# Patient Record
Sex: MALE | Race: ASIAN | NOT HISPANIC OR LATINO | URBAN - METROPOLITAN AREA
[De-identification: names, ages, dates, MRNs, and addresses within clinical notes are randomized per-mention and may not be internally consistent; named-entity substitution may affect disease eponyms.]

---

## 2018-05-03 PROBLEM — Z00.00 ENCOUNTER FOR PREVENTIVE HEALTH EXAMINATION: Status: ACTIVE | Noted: 2018-05-03

## 2018-05-04 ENCOUNTER — OUTPATIENT (OUTPATIENT)
Dept: OUTPATIENT SERVICES | Facility: HOSPITAL | Age: 73
LOS: 1 days | End: 2018-05-04
Payer: MEDICARE

## 2018-05-04 ENCOUNTER — APPOINTMENT (OUTPATIENT)
Dept: THORACIC SURGERY | Facility: CLINIC | Age: 73
End: 2018-05-04
Payer: MEDICARE

## 2018-05-04 VITALS
BODY MASS INDEX: 24.48 KG/M2 | RESPIRATION RATE: 18 BRPM | HEIGHT: 62 IN | WEIGHT: 133 LBS | SYSTOLIC BLOOD PRESSURE: 143 MMHG | TEMPERATURE: 97.8 F | DIASTOLIC BLOOD PRESSURE: 67 MMHG | HEART RATE: 74 BPM | OXYGEN SATURATION: 96 %

## 2018-05-04 VITALS
WEIGHT: 144 LBS | TEMPERATURE: 97 F | RESPIRATION RATE: 18 BRPM | HEIGHT: 69 IN | DIASTOLIC BLOOD PRESSURE: 72 MMHG | HEART RATE: 75 BPM | SYSTOLIC BLOOD PRESSURE: 128 MMHG | BODY MASS INDEX: 21.33 KG/M2 | OXYGEN SATURATION: 95 %

## 2018-05-04 DIAGNOSIS — J43.9 EMPHYSEMA, UNSPECIFIED: ICD-10-CM

## 2018-05-04 DIAGNOSIS — E78.5 HYPERLIPIDEMIA, UNSPECIFIED: ICD-10-CM

## 2018-05-04 DIAGNOSIS — Z87.891 PERSONAL HISTORY OF NICOTINE DEPENDENCE: ICD-10-CM

## 2018-05-04 DIAGNOSIS — R91.1 SOLITARY PULMONARY NODULE: ICD-10-CM

## 2018-05-04 DIAGNOSIS — Z87.19 PERSONAL HISTORY OF OTHER DISEASES OF THE DIGESTIVE SYSTEM: ICD-10-CM

## 2018-05-04 LAB
ALBUMIN SERPL ELPH-MCNC: 4.3 G/DL — SIGNIFICANT CHANGE UP (ref 3.3–5)
ALP SERPL-CCNC: 70 U/L — SIGNIFICANT CHANGE UP (ref 40–120)
ALT FLD-CCNC: 26 U/L — SIGNIFICANT CHANGE UP (ref 10–45)
ANION GAP SERPL CALC-SCNC: 13 MMOL/L — SIGNIFICANT CHANGE UP (ref 5–17)
APPEARANCE UR: CLEAR — SIGNIFICANT CHANGE UP
APTT BLD: 35.8 SEC — SIGNIFICANT CHANGE UP (ref 27.5–37.4)
AST SERPL-CCNC: 21 U/L — SIGNIFICANT CHANGE UP (ref 10–40)
BACTERIA # UR AUTO: PRESENT /HPF
BASOPHILS NFR BLD AUTO: 0.3 % — SIGNIFICANT CHANGE UP (ref 0–2)
BILIRUB SERPL-MCNC: 0.4 MG/DL — SIGNIFICANT CHANGE UP (ref 0.2–1.2)
BILIRUB UR-MCNC: NEGATIVE — SIGNIFICANT CHANGE UP
BUN SERPL-MCNC: 15 MG/DL — SIGNIFICANT CHANGE UP (ref 7–23)
CALCIUM SERPL-MCNC: 9.7 MG/DL — SIGNIFICANT CHANGE UP (ref 8.4–10.5)
CHLORIDE SERPL-SCNC: 102 MMOL/L — SIGNIFICANT CHANGE UP (ref 96–108)
CHOLEST SERPL-MCNC: 188 MG/DL — SIGNIFICANT CHANGE UP (ref 10–199)
CO2 SERPL-SCNC: 25 MMOL/L — SIGNIFICANT CHANGE UP (ref 22–31)
COLOR SPEC: YELLOW — SIGNIFICANT CHANGE UP
CREAT SERPL-MCNC: 0.77 MG/DL — SIGNIFICANT CHANGE UP (ref 0.5–1.3)
DIFF PNL FLD: (no result)
EOSINOPHIL NFR BLD AUTO: 1.7 % — SIGNIFICANT CHANGE UP (ref 0–6)
GLUCOSE SERPL-MCNC: 103 MG/DL — HIGH (ref 70–99)
GLUCOSE UR QL: NEGATIVE — SIGNIFICANT CHANGE UP
HBA1C BLD-MCNC: 5.9 % — HIGH (ref 4–5.6)
HBV SURFACE AG SER-ACNC: SIGNIFICANT CHANGE UP
HCT VFR BLD CALC: 46.9 % — SIGNIFICANT CHANGE UP (ref 39–50)
HDLC SERPL-MCNC: 68 MG/DL — SIGNIFICANT CHANGE UP (ref 40–125)
HGB BLD-MCNC: 15.8 G/DL — SIGNIFICANT CHANGE UP (ref 13–17)
INR BLD: 0.97 — SIGNIFICANT CHANGE UP (ref 0.88–1.16)
KETONES UR-MCNC: NEGATIVE — SIGNIFICANT CHANGE UP
LEUKOCYTE ESTERASE UR-ACNC: NEGATIVE — SIGNIFICANT CHANGE UP
LIPID PNL WITH DIRECT LDL SERPL: 83 MG/DL — SIGNIFICANT CHANGE UP
LYMPHOCYTES # BLD AUTO: 19.9 % — SIGNIFICANT CHANGE UP (ref 13–44)
MCHC RBC-ENTMCNC: 31.4 PG — SIGNIFICANT CHANGE UP (ref 27–34)
MCHC RBC-ENTMCNC: 33.7 G/DL — SIGNIFICANT CHANGE UP (ref 32–36)
MCV RBC AUTO: 93.2 FL — SIGNIFICANT CHANGE UP (ref 80–100)
MONOCYTES NFR BLD AUTO: 11 % — SIGNIFICANT CHANGE UP (ref 2–14)
NEUTROPHILS NFR BLD AUTO: 67.1 % — SIGNIFICANT CHANGE UP (ref 43–77)
NITRITE UR-MCNC: NEGATIVE — SIGNIFICANT CHANGE UP
PH UR: 6 — SIGNIFICANT CHANGE UP (ref 5–8)
PLATELET # BLD AUTO: 255 K/UL — SIGNIFICANT CHANGE UP (ref 150–400)
POTASSIUM SERPL-MCNC: 4.2 MMOL/L — SIGNIFICANT CHANGE UP (ref 3.5–5.3)
POTASSIUM SERPL-SCNC: 4.2 MMOL/L — SIGNIFICANT CHANGE UP (ref 3.5–5.3)
PROT SERPL-MCNC: 7.6 G/DL — SIGNIFICANT CHANGE UP (ref 6–8.3)
PROT UR-MCNC: NEGATIVE MG/DL — SIGNIFICANT CHANGE UP
PROTHROM AB SERPL-ACNC: 10.8 SEC — SIGNIFICANT CHANGE UP (ref 9.8–12.7)
RBC # BLD: 5.03 M/UL — SIGNIFICANT CHANGE UP (ref 4.2–5.8)
RBC # FLD: 13.3 % — SIGNIFICANT CHANGE UP (ref 10.3–16.9)
RBC CASTS # UR COMP ASSIST: < 5 /HPF — SIGNIFICANT CHANGE UP
SODIUM SERPL-SCNC: 140 MMOL/L — SIGNIFICANT CHANGE UP (ref 135–145)
SP GR SPEC: 1.02 — SIGNIFICANT CHANGE UP (ref 1–1.03)
TOTAL CHOLESTEROL/HDL RATIO MEASUREMENT: 2.8 RATIO — LOW (ref 3.4–9.6)
TRIGL SERPL-MCNC: 185 MG/DL — HIGH (ref 10–149)
TSH SERPL-MCNC: 0.99 UIU/ML — SIGNIFICANT CHANGE UP (ref 0.35–4.94)
UROBILINOGEN FLD QL: 2 E.U./DL
WBC # BLD: 7.8 K/UL — SIGNIFICANT CHANGE UP (ref 3.8–10.5)
WBC # FLD AUTO: 7.8 K/UL — SIGNIFICANT CHANGE UP (ref 3.8–10.5)
WBC UR QL: < 5 /HPF — SIGNIFICANT CHANGE UP

## 2018-05-04 PROCEDURE — 36415 COLL VENOUS BLD VENIPUNCTURE: CPT

## 2018-05-04 PROCEDURE — 94010 BREATHING CAPACITY TEST: CPT | Mod: 26

## 2018-05-04 PROCEDURE — 81001 URINALYSIS AUTO W/SCOPE: CPT

## 2018-05-04 PROCEDURE — 80061 LIPID PANEL: CPT

## 2018-05-04 PROCEDURE — 99205 OFFICE O/P NEW HI 60 MIN: CPT

## 2018-05-04 PROCEDURE — 84443 ASSAY THYROID STIM HORMONE: CPT

## 2018-05-04 PROCEDURE — 83036 HEMOGLOBIN GLYCOSYLATED A1C: CPT

## 2018-05-04 PROCEDURE — 94726 PLETHYSMOGRAPHY LUNG VOLUMES: CPT | Mod: 26

## 2018-05-04 PROCEDURE — 86901 BLOOD TYPING SEROLOGIC RH(D): CPT

## 2018-05-04 PROCEDURE — 85025 COMPLETE CBC W/AUTO DIFF WBC: CPT

## 2018-05-04 PROCEDURE — 86900 BLOOD TYPING SEROLOGIC ABO: CPT

## 2018-05-04 PROCEDURE — 94760 N-INVAS EAR/PLS OXIMETRY 1: CPT

## 2018-05-04 PROCEDURE — 80053 COMPREHEN METABOLIC PANEL: CPT

## 2018-05-04 PROCEDURE — 94729 DIFFUSING CAPACITY: CPT

## 2018-05-04 PROCEDURE — 94060 EVALUATION OF WHEEZING: CPT

## 2018-05-04 PROCEDURE — 94729 DIFFUSING CAPACITY: CPT | Mod: 26

## 2018-05-04 PROCEDURE — 94726 PLETHYSMOGRAPHY LUNG VOLUMES: CPT

## 2018-05-04 PROCEDURE — 85610 PROTHROMBIN TIME: CPT

## 2018-05-04 PROCEDURE — 85730 THROMBOPLASTIN TIME PARTIAL: CPT

## 2018-05-04 PROCEDURE — 86850 RBC ANTIBODY SCREEN: CPT

## 2018-05-04 PROCEDURE — 87340 HEPATITIS B SURFACE AG IA: CPT

## 2018-05-05 PROBLEM — Z87.891 FORMER SMOKER: Status: ACTIVE | Noted: 2018-05-05

## 2018-05-05 PROBLEM — E78.5 HLD (HYPERLIPIDEMIA): Status: ACTIVE | Noted: 2018-05-05

## 2018-05-05 PROBLEM — J43.9 EMPHYSEMA OF LUNG: Status: ACTIVE | Noted: 2018-05-05

## 2018-05-05 PROBLEM — Z87.19 HISTORY OF INGUINAL HERNIA: Status: RESOLVED | Noted: 2018-05-05 | Resolved: 2018-05-05

## 2018-05-05 RX ORDER — CETIRIZINE HYDROCHLORIDE 10 MG/1
10 TABLET, COATED ORAL DAILY
Refills: 0 | Status: ACTIVE | COMMUNITY

## 2018-05-05 RX ORDER — ASPIRIN 81 MG/1
81 TABLET ORAL DAILY
Refills: 0 | Status: ACTIVE | COMMUNITY

## 2018-05-05 RX ORDER — CHOLECALCIFEROL (VITAMIN D3) 1250 MCG
1.25 MG CAPSULE ORAL WEEKLY
Refills: 0 | Status: ACTIVE | COMMUNITY

## 2018-05-05 RX ORDER — ATORVASTATIN CALCIUM 10 MG/1
10 TABLET, FILM COATED ORAL
Refills: 0 | Status: ACTIVE | COMMUNITY

## 2018-05-05 RX ORDER — ICOSAPENT ETHYL 1000 MG/1
1 CAPSULE ORAL TWICE DAILY
Refills: 0 | Status: ACTIVE | COMMUNITY

## 2018-05-07 LAB
M TB TUBERC IFN-G BLD QL: 0.03 IU/ML — SIGNIFICANT CHANGE UP
M TB TUBERC IFN-G BLD QL: 0.8 IU/ML — SIGNIFICANT CHANGE UP
M TB TUBERC IFN-G BLD QL: POSITIVE
MITOGEN IGNF BCKGRD COR BLD-ACNC: >10 IU/ML — SIGNIFICANT CHANGE UP

## 2018-05-10 VITALS
DIASTOLIC BLOOD PRESSURE: 68 MMHG | TEMPERATURE: 98 F | OXYGEN SATURATION: 95 % | HEART RATE: 74 BPM | SYSTOLIC BLOOD PRESSURE: 121 MMHG | RESPIRATION RATE: 16 BRPM | HEIGHT: 69 IN | WEIGHT: 143.96 LBS

## 2018-05-11 ENCOUNTER — RESULT REVIEW (OUTPATIENT)
Age: 73
End: 2018-05-11

## 2018-05-11 ENCOUNTER — INPATIENT (INPATIENT)
Facility: HOSPITAL | Age: 73
LOS: 3 days | Discharge: HOME | DRG: 165 | End: 2018-05-15
Attending: THORACIC SURGERY (CARDIOTHORACIC VASCULAR SURGERY) | Admitting: THORACIC SURGERY (CARDIOTHORACIC VASCULAR SURGERY)
Payer: MEDICARE

## 2018-05-11 ENCOUNTER — APPOINTMENT (OUTPATIENT)
Dept: THORACIC SURGERY | Facility: HOSPITAL | Age: 73
End: 2018-05-11

## 2018-05-11 DIAGNOSIS — R91.1 SOLITARY PULMONARY NODULE: ICD-10-CM

## 2018-05-11 DIAGNOSIS — Z98.890 OTHER SPECIFIED POSTPROCEDURAL STATES: Chronic | ICD-10-CM

## 2018-05-11 LAB
ANION GAP SERPL CALC-SCNC: 14 MMOL/L — SIGNIFICANT CHANGE UP (ref 5–17)
APTT BLD: 28.4 SEC — SIGNIFICANT CHANGE UP (ref 27.5–37.4)
BASE EXCESS BLDA CALC-SCNC: -0.5 MMOL/L — SIGNIFICANT CHANGE UP (ref -2–3)
BUN SERPL-MCNC: 14 MG/DL — SIGNIFICANT CHANGE UP (ref 7–23)
CALCIUM SERPL-MCNC: 8.9 MG/DL — SIGNIFICANT CHANGE UP (ref 8.4–10.5)
CHLORIDE SERPL-SCNC: 99 MMOL/L — SIGNIFICANT CHANGE UP (ref 96–108)
CO2 SERPL-SCNC: 24 MMOL/L — SIGNIFICANT CHANGE UP (ref 22–31)
CREAT SERPL-MCNC: 0.77 MG/DL — SIGNIFICANT CHANGE UP (ref 0.5–1.3)
GAS PNL BLDA: SIGNIFICANT CHANGE UP
GLUCOSE SERPL-MCNC: 175 MG/DL — HIGH (ref 70–99)
HCO3 BLDA-SCNC: 24 MMOL/L — SIGNIFICANT CHANGE UP (ref 21–28)
HCT VFR BLD CALC: 44.1 % — SIGNIFICANT CHANGE UP (ref 39–50)
HGB BLD-MCNC: 14.9 G/DL — SIGNIFICANT CHANGE UP (ref 13–17)
INR BLD: 1.06 — SIGNIFICANT CHANGE UP (ref 0.88–1.16)
MCHC RBC-ENTMCNC: 31 PG — SIGNIFICANT CHANGE UP (ref 27–34)
MCHC RBC-ENTMCNC: 33.8 G/DL — SIGNIFICANT CHANGE UP (ref 32–36)
MCV RBC AUTO: 91.9 FL — SIGNIFICANT CHANGE UP (ref 80–100)
PCO2 BLDA: 41 MMHG — SIGNIFICANT CHANGE UP (ref 35–48)
PH BLDA: 7.39 — SIGNIFICANT CHANGE UP (ref 7.35–7.45)
PLATELET # BLD AUTO: 263 K/UL — SIGNIFICANT CHANGE UP (ref 150–400)
PO2 BLDA: 120 MMHG — HIGH (ref 83–108)
POTASSIUM SERPL-MCNC: 4.4 MMOL/L — SIGNIFICANT CHANGE UP (ref 3.5–5.3)
POTASSIUM SERPL-SCNC: 4.4 MMOL/L — SIGNIFICANT CHANGE UP (ref 3.5–5.3)
PROTHROM AB SERPL-ACNC: 11.8 SEC — SIGNIFICANT CHANGE UP (ref 9.8–12.7)
RBC # BLD: 4.8 M/UL — SIGNIFICANT CHANGE UP (ref 4.2–5.8)
RBC # FLD: 13 % — SIGNIFICANT CHANGE UP (ref 10.3–16.9)
SAO2 % BLDA: 98 % — SIGNIFICANT CHANGE UP (ref 95–100)
SODIUM SERPL-SCNC: 137 MMOL/L — SIGNIFICANT CHANGE UP (ref 135–145)
WBC # BLD: 18.5 K/UL — HIGH (ref 3.8–10.5)
WBC # FLD AUTO: 18.5 K/UL — HIGH (ref 3.8–10.5)

## 2018-05-11 PROCEDURE — S2900 ROBOTIC SURGICAL SYSTEM: CPT | Mod: NC

## 2018-05-11 PROCEDURE — 32666 THORACOSCOPY W/WEDGE RESECT: CPT | Mod: 59

## 2018-05-11 PROCEDURE — 71045 X-RAY EXAM CHEST 1 VIEW: CPT | Mod: 26,76

## 2018-05-11 PROCEDURE — 32674 THORACOSCOPY LYMPH NODE EXC: CPT

## 2018-05-11 PROCEDURE — 32668 THORACOSCOPY W/W RESECT DIAG: CPT

## 2018-05-11 PROCEDURE — 32652 THORACOSCOPY REM TOTL CORTEX: CPT

## 2018-05-11 PROCEDURE — 32669 THORACOSCOPY REMOVE SEGMENT: CPT

## 2018-05-11 RX ORDER — CEFAZOLIN SODIUM 1 G
2000 VIAL (EA) INJECTION EVERY 8 HOURS
Qty: 0 | Refills: 0 | Status: DISCONTINUED | OUTPATIENT
Start: 2018-05-11 | End: 2018-05-12

## 2018-05-11 RX ORDER — SENNA PLUS 8.6 MG/1
2 TABLET ORAL AT BEDTIME
Qty: 0 | Refills: 0 | Status: DISCONTINUED | OUTPATIENT
Start: 2018-05-11 | End: 2018-05-15

## 2018-05-11 RX ORDER — KETOROLAC TROMETHAMINE 30 MG/ML
15 SYRINGE (ML) INJECTION EVERY 8 HOURS
Qty: 0 | Refills: 0 | Status: DISCONTINUED | OUTPATIENT
Start: 2018-05-11 | End: 2018-05-15

## 2018-05-11 RX ORDER — BUPIVACAINE 13.3 MG/ML
20 INJECTION, SUSPENSION, LIPOSOMAL INFILTRATION ONCE
Qty: 0 | Refills: 0 | Status: DISCONTINUED | OUTPATIENT
Start: 2018-05-11 | End: 2018-05-13

## 2018-05-11 RX ORDER — SODIUM CHLORIDE 9 MG/ML
1000 INJECTION, SOLUTION INTRAVENOUS
Qty: 0 | Refills: 0 | Status: DISCONTINUED | OUTPATIENT
Start: 2018-05-11 | End: 2018-05-13

## 2018-05-11 RX ORDER — DOCUSATE SODIUM 100 MG
100 CAPSULE ORAL THREE TIMES A DAY
Qty: 0 | Refills: 0 | Status: DISCONTINUED | OUTPATIENT
Start: 2018-05-11 | End: 2018-05-15

## 2018-05-11 RX ORDER — BUPIVACAINE 13.3 MG/ML
20 INJECTION, SUSPENSION, LIPOSOMAL INFILTRATION ONCE
Qty: 0 | Refills: 0 | Status: DISCONTINUED | OUTPATIENT
Start: 2018-05-11 | End: 2018-05-11

## 2018-05-11 RX ORDER — CHOLECALCIFEROL (VITAMIN D3) 125 MCG
1 CAPSULE ORAL
Qty: 0 | Refills: 0 | COMMUNITY

## 2018-05-11 RX ORDER — CETIRIZINE HYDROCHLORIDE 10 MG/1
1 TABLET ORAL
Qty: 0 | Refills: 0 | COMMUNITY

## 2018-05-11 RX ORDER — ATORVASTATIN CALCIUM 80 MG/1
10 TABLET, FILM COATED ORAL AT BEDTIME
Qty: 0 | Refills: 0 | Status: DISCONTINUED | OUTPATIENT
Start: 2018-05-11 | End: 2018-05-15

## 2018-05-11 RX ORDER — ACETAMINOPHEN 500 MG
1000 TABLET ORAL ONCE
Qty: 0 | Refills: 0 | Status: COMPLETED | OUTPATIENT
Start: 2018-05-11 | End: 2018-05-11

## 2018-05-11 RX ORDER — HEPARIN SODIUM 5000 [USP'U]/ML
5000 INJECTION INTRAVENOUS; SUBCUTANEOUS EVERY 12 HOURS
Qty: 0 | Refills: 0 | Status: DISCONTINUED | OUTPATIENT
Start: 2018-05-11 | End: 2018-05-15

## 2018-05-11 RX ORDER — PANTOPRAZOLE SODIUM 20 MG/1
40 TABLET, DELAYED RELEASE ORAL
Qty: 0 | Refills: 0 | Status: DISCONTINUED | OUTPATIENT
Start: 2018-05-11 | End: 2018-05-15

## 2018-05-11 RX ADMIN — Medication 15 MILLIGRAM(S): at 22:04

## 2018-05-11 RX ADMIN — SENNA PLUS 2 TABLET(S): 8.6 TABLET ORAL at 22:04

## 2018-05-11 RX ADMIN — SODIUM CHLORIDE 30 MILLILITER(S): 9 INJECTION, SOLUTION INTRAVENOUS at 17:03

## 2018-05-11 RX ADMIN — Medication 1000 MILLIGRAM(S): at 20:40

## 2018-05-11 RX ADMIN — ATORVASTATIN CALCIUM 10 MILLIGRAM(S): 80 TABLET, FILM COATED ORAL at 22:04

## 2018-05-11 RX ADMIN — Medication 100 MILLIGRAM(S): at 22:04

## 2018-05-11 RX ADMIN — Medication 400 MILLIGRAM(S): at 19:46

## 2018-05-11 RX ADMIN — Medication 100 MILLIGRAM(S): at 19:46

## 2018-05-11 RX ADMIN — Medication 15 MILLIGRAM(S): at 22:20

## 2018-05-11 NOTE — H&P ADULT - NSHPREVIEWOFSYSTEMS_GEN_ALL_CORE
Review of Systems  CONSTITUTIONAL:  Denies Fevers / chills, sweats, fatigue, weight loss, weight gain                                      NEURO:  Denies parethesias, seizures, syncope, confusion                                                                                EYES:  Denies Blurry vision, discharge, pain, loss of vision                                                                                    ENMT:  Denies Difficulty hearing, vertigo, dysphagia, epistaxis, recent dental work                                       CV:  Denies Chest pain, palpitations, ROTH, orthopnea                                                                                          RESPIRATORY:  Denies Wheezing, SOB, cough / sputum, hemoptysis                                                                GI:  Denies Nausea, vomiting, diarrhea, constipation, melena, difficulty swallowing                                               : Denies Hematuria, dysuria, urgency, incontinence                                                                                         MUSCULOSKELETAL:  Denies arthritis, joint swelling, muscle weakness                                                             SKIN/BREAST:  Denies rash, itching, hair loss, masses                                                                                            PSYCH:  Denies depression, anxiety, suicidal ideation                                                                                               HEME/LYMPH:  Denies bruises easily, enlarged lymph nodes, tender lymph nodes                                        ENDOCRINE:  Denies cold intolerance, heat intolerance, polydipsia

## 2018-05-11 NOTE — PROGRESS NOTE ADULT - SUBJECTIVE AND OBJECTIVE BOX
Post operative / PACU note     Operation / Date: 5/11/18 L VATS LLL basilar segmentectomy, frozen + adenoCA    SUBJECTIVE ASSESSMENT:  Patient seen this afternoon at bedside, doing well and not offering any complaints at this time. Denies any chest pain, shortness of breath or palpitations.     Vital Signs Last 24 Hrs  T(C): 36.2 (11 May 2018 15:41), Max: 36.2 (11 May 2018 15:41)  T(F): 97.1 (11 May 2018 15:41), Max: 97.1 (11 May 2018 15:41)  HR: 68 (11 May 2018 16:34) (64 - 68)  BP: 138/76 (11 May 2018 16:34) (112/65 - 138/76)  BP(mean): 88 (11 May 2018 16:19) (88 - 88)  RR: 27 (11 May 2018 16:34) (16 - 27)  SpO2: 98% (11 May 2018 16:34) (98% - 100%)  I&O's Detail      CHEST TUBE: Yes on suction, no air leak   LEYLA DRAIN:  No  EPICARDIAL WIRES: No  TIE DOWNS: No  NATION: Yes     PHYSICAL EXAM:    General: Patient lying comfortably in bed, no acute distress     Neurological: Alert and oriented. No focal neurological deficits     Cardiovascular: S1S2, RRR, no murmurs appreciated on exam     Respiratory: Apices clear to ausculation, unable to auscultate posterior lung fields     Gastrointestinal: Abdomen soft, non tender, non distended     Extremities: Warm and well perfused. No edema or calf tenderness     Vascular: Peripheral pulses 2+ bilaterally     Incision Sites:  L VATS incisions C/D/I   Chest tube secured in place     LABS:                        14.9   18.5  )-----------( 263      ( 11 May 2018 16:27 )             44.1       COUMADIN: No        05-11    137  |  99  |  14  ----------------------------<  175<H>  4.4   |  24  |  0.77    Ca    8.9      11 May 2018 16:27            MEDICATIONS  (STANDING):  acetaminophen  IVPB. 1000 milliGRAM(s) IV Intermittent once  atorvastatin 10 milliGRAM(s) Oral at bedtime  BUpivacaine liposome 1.3% Injectable (no eMAR) 20 milliLiter(s) Local Injection once  ceFAZolin   IVPB 2000 milliGRAM(s) IV Intermittent every 8 hours  heparin  Injectable 5000 Unit(s) SubCutaneous every 12 hours  lactated ringers. 1000 milliLiter(s) (30 mL/Hr) IV Continuous <Continuous>    MEDICATIONS  (PRN):  ketorolac   Injectable 15 milliGRAM(s) IV Push every 8 hours PRN Moderate Pain        RADIOLOGY & ADDITIONAL TESTS:    A/P: 72 year old male, former smoker, PMHx HLD, gastric ulcer, bilateral inguinal hernia s/p repair 2009 referred to Dr. Weber as an outpatient for finding of new LLL lung nodule. Patient presented today for elective surgery and underwent L VATS LLL segmentectomy Procedure uncomplicated, seen in PACU doing well.     Neurovascular: Stable. Pain well controlled with current regimen.  - continue tylenol and toradol PRN     Cardiovascular: Hemodynamically stable. HR controlled.  - HLD, continue atorvastatin 10mg qhs     Respiratory: 02 Sat = 99% on supplemental O2   - Continue to wean O2 to maintain SaO2 > 93%   - Post op CXR stable, follow up CXR in AM   - Left pleural chest tube in place, to remain on suction overnight   - Encourage C+DB and Use of IS 10x / hr while awake.    GI: Stable.  - Advance diet as tolerated   - protonix 40mg for GI ppx   - continue bowel regimen     Renal / : Cr 0.77, no active issues   - Monitor renal function.  - Monitor I/O's.    Endocrine: hgba1c 5.9, TSH 0.994   - no active issues     Hematologic:  -CBC.  -Coagulation Panel.    ID:  -Tempature.  -CBC.  -Observe for SIRS/Sepsis Syndrome.    Prophylaxis:  -DVT prophylaxis with 5000 SubQ Heparin q8h.  -SCD's    Disposition: 9L post PACU

## 2018-05-11 NOTE — H&P ADULT - ASSESSMENT
This is a 73 y/o male, former smoker x 30 years (1PPD; Quit 6 yrs ago), with a PMHx of HLD, gastric ulcer, B/L inguinal hernia in 1995 in [place], Rt and Lt inguinal hernia repair 2009 who was referred to Dr. Weber by Dr. Baltazar Nguyen for evaluation of LLL lung nodule.  CT chest done 4/7/18 revealing a LLL nodule new since 2014.  PET scan and other pre-op testing has been complete, reports on chart.  Here now for elective surgery.

## 2018-05-11 NOTE — BRIEF OPERATIVE NOTE - PROCEDURE
<<-----Click on this checkbox to enter Procedure Segmentectomy, lung, using video-assisted thoracoscopic surgery  05/11/2018  basilar left lower lobe  Active  [id number]

## 2018-05-11 NOTE — H&P ADULT - PROBLEM SELECTOR PLAN 1
Admit under Dr. Weber via same day surgery. Consent signed, placed on chart.  Risks/benefits reviewed, patient understands and agrees. T&S ordered and blood products placed on hold for OR.  To 9LA post-op. Admit under Dr. Weber via same day surgery for left VATS, LLL wedge resection, possible lobectomy. Consent signed, placed on chart.  Risks/benefits reviewed, patient understands and agrees. T&S ordered and blood products placed on hold for OR.  To 9LA post-op.

## 2018-05-11 NOTE — H&P ADULT - NSHPPHYSICALEXAM_GEN_ALL_CORE
Physical Exam  CONSTITUTIONAL:                                                              WNL  NEURO:                                                                       WNL                      EYES:                                                                                WNL  ENMT:                                                                               WNL  CV:                                                                                   WNL  RESPIRATORY:                                                                 WNL  GI:                                                                                     WNL  : NATION + / -                                                                  WNL  MUSKULOSKELETAL:                                                       WNL  SKIN / BREAST:                                                                  WNL Physical Exam  CONSTITUTIONAL:                                                              NAD, pleasant, smiling.  Daughter present.   NEURO:                                                                      NO focal deficits.                    EYES:                                                                                WNL  ENMT:                                                                               WNL  CV:                                                                                  S1S2 regular.    RESPIRATORY:                                                                 Clear B/L  GI:                                                                                     WNL  : THAO + / -                                                                  NO thao   MUSKULOSKELETAL:                                                       WNL  SKIN / BREAST:                                                                  WNL  Ext: Warm and well perfused.

## 2018-05-11 NOTE — H&P ADULT - HISTORY OF PRESENT ILLNESS
Patient seen in same day holding area; Reports no changes to PMHx or medications since last seen by our team. Denies acute or current SOB, chest pain, palpitation, N/V/D, fever/chills, recent illness, or any other concerning symptoms. This is a 71 y/o male, former smoker x 30 years (1PPD; Quit 6 yrs ago), with a PMHx of HLD, gastric ulcer, B/L inguinal hernia in 1995 in [place], Rt and Lt inguinal hernia repair 2009 who was referred to Dr. Weber by Dr. Baltazar Nguyen for evaluation of LLL lung nodule.  CT chest done 4/7/18 revealing a LLL nodule new since 2014.  PET scan and other pre-op testing has been complete, reports on chart.  Here now for elective surgery.     Patient seen in same day holding area; Reports no changes to PMHx or medications since last seen by our team. Denies acute or current SOB, chest pain, palpitation, N/V/D, fever/chills, recent illness, or any other concerning symptoms. This is a 73 y/o male, former smoker x 30 years (1PPD; Quit 6 yrs ago), with a PMHx of HLD, gastric ulcer, B/L inguinal hernia in 1995 in [place], Rt and Lt inguinal hernia repair 2009 who was referred to Dr. Weber by Dr. Baltazar Nguyen for evaluation of LLL lung nodule.  CT chest done 4/7/18 revealing a LLL nodule new since 2014.  PET scan and other pre-op testing has been complete, reports on chart.  Here now for elective surgery.  **Last took ASA 1 week ago.     Patient seen in same day holding area; Reports no changes to PMHx or medications since last seen by our team. Denies acute or current SOB, chest pain, palpitation, N/V/D, fever/chills, recent illness, or any other concerning symptoms.

## 2018-05-12 LAB
ANION GAP SERPL CALC-SCNC: 15 MMOL/L — SIGNIFICANT CHANGE UP (ref 5–17)
APTT BLD: 28.7 SEC — SIGNIFICANT CHANGE UP (ref 27.5–37.4)
BUN SERPL-MCNC: 15 MG/DL — SIGNIFICANT CHANGE UP (ref 7–23)
CALCIUM SERPL-MCNC: 8.8 MG/DL — SIGNIFICANT CHANGE UP (ref 8.4–10.5)
CHLORIDE SERPL-SCNC: 99 MMOL/L — SIGNIFICANT CHANGE UP (ref 96–108)
CO2 SERPL-SCNC: 24 MMOL/L — SIGNIFICANT CHANGE UP (ref 22–31)
CREAT SERPL-MCNC: 0.8 MG/DL — SIGNIFICANT CHANGE UP (ref 0.5–1.3)
GLUCOSE SERPL-MCNC: 148 MG/DL — HIGH (ref 70–99)
HCT VFR BLD CALC: 44.8 % — SIGNIFICANT CHANGE UP (ref 39–50)
HGB BLD-MCNC: 15.2 G/DL — SIGNIFICANT CHANGE UP (ref 13–17)
INR BLD: 1.08 — SIGNIFICANT CHANGE UP (ref 0.88–1.16)
MAGNESIUM SERPL-MCNC: 2.1 MG/DL — SIGNIFICANT CHANGE UP (ref 1.6–2.6)
MCHC RBC-ENTMCNC: 31.3 PG — SIGNIFICANT CHANGE UP (ref 27–34)
MCHC RBC-ENTMCNC: 33.9 G/DL — SIGNIFICANT CHANGE UP (ref 32–36)
MCV RBC AUTO: 92.4 FL — SIGNIFICANT CHANGE UP (ref 80–100)
PHOSPHATE SERPL-MCNC: 4.1 MG/DL — SIGNIFICANT CHANGE UP (ref 2.5–4.5)
PLATELET # BLD AUTO: 257 K/UL — SIGNIFICANT CHANGE UP (ref 150–400)
POTASSIUM SERPL-MCNC: 4.2 MMOL/L — SIGNIFICANT CHANGE UP (ref 3.5–5.3)
POTASSIUM SERPL-SCNC: 4.2 MMOL/L — SIGNIFICANT CHANGE UP (ref 3.5–5.3)
PROTHROM AB SERPL-ACNC: 12 SEC — SIGNIFICANT CHANGE UP (ref 9.8–12.7)
RBC # BLD: 4.85 M/UL — SIGNIFICANT CHANGE UP (ref 4.2–5.8)
RBC # FLD: 13.1 % — SIGNIFICANT CHANGE UP (ref 10.3–16.9)
SODIUM SERPL-SCNC: 138 MMOL/L — SIGNIFICANT CHANGE UP (ref 135–145)
WBC # BLD: 13.1 K/UL — HIGH (ref 3.8–10.5)
WBC # FLD AUTO: 13.1 K/UL — HIGH (ref 3.8–10.5)

## 2018-05-12 PROCEDURE — 71045 X-RAY EXAM CHEST 1 VIEW: CPT | Mod: 26

## 2018-05-12 RX ORDER — POLYETHYLENE GLYCOL 3350 17 G/17G
17 POWDER, FOR SOLUTION ORAL DAILY
Qty: 0 | Refills: 0 | Status: DISCONTINUED | OUTPATIENT
Start: 2018-05-12 | End: 2018-05-15

## 2018-05-12 RX ORDER — ACETAMINOPHEN 500 MG
650 TABLET ORAL EVERY 6 HOURS
Qty: 0 | Refills: 0 | Status: DISCONTINUED | OUTPATIENT
Start: 2018-05-13 | End: 2018-05-15

## 2018-05-12 RX ORDER — CEFAZOLIN SODIUM 1 G
2000 VIAL (EA) INJECTION EVERY 8 HOURS
Qty: 0 | Refills: 0 | Status: COMPLETED | OUTPATIENT
Start: 2018-05-12 | End: 2018-05-12

## 2018-05-12 RX ORDER — ACETAMINOPHEN 500 MG
1000 TABLET ORAL ONCE
Qty: 0 | Refills: 0 | Status: DISCONTINUED | OUTPATIENT
Start: 2018-05-12 | End: 2018-05-13

## 2018-05-12 RX ADMIN — SENNA PLUS 2 TABLET(S): 8.6 TABLET ORAL at 21:51

## 2018-05-12 RX ADMIN — POLYETHYLENE GLYCOL 3350 17 GRAM(S): 17 POWDER, FOR SOLUTION ORAL at 16:41

## 2018-05-12 RX ADMIN — Medication 100 MILLIGRAM(S): at 05:21

## 2018-05-12 RX ADMIN — HEPARIN SODIUM 5000 UNIT(S): 5000 INJECTION INTRAVENOUS; SUBCUTANEOUS at 05:21

## 2018-05-12 RX ADMIN — Medication 100 MILLIGRAM(S): at 21:51

## 2018-05-12 RX ADMIN — HEPARIN SODIUM 5000 UNIT(S): 5000 INJECTION INTRAVENOUS; SUBCUTANEOUS at 18:40

## 2018-05-12 RX ADMIN — Medication 2000 MILLIGRAM(S): at 12:21

## 2018-05-12 RX ADMIN — Medication 100 MILLIGRAM(S): at 13:22

## 2018-05-12 RX ADMIN — PANTOPRAZOLE SODIUM 40 MILLIGRAM(S): 20 TABLET, DELAYED RELEASE ORAL at 07:07

## 2018-05-12 RX ADMIN — Medication 2000 MILLIGRAM(S): at 04:30

## 2018-05-12 RX ADMIN — Medication 15 MILLIGRAM(S): at 16:41

## 2018-05-12 RX ADMIN — Medication 15 MILLIGRAM(S): at 18:09

## 2018-05-12 RX ADMIN — ATORVASTATIN CALCIUM 10 MILLIGRAM(S): 80 TABLET, FILM COATED ORAL at 21:51

## 2018-05-12 NOTE — PROGRESS NOTE ADULT - SUBJECTIVE AND OBJECTIVE BOX
Patient discussed on morning rounds with Dr. Hilliard    Operation / Date: 5/11/18 RA L multiple wedge resection with MARIELA basilar segmentectomy, MLND    SUBJECTIVE ASSESSMENT:  72y Male seen and examined. Today patient has no acute complaints, is ambulating, using IS, pulling 1000cc, tolerating PO diet, no BM yet, is passing gas.  Denies fever, chest pain, palpitations, SOB, abdoimnal pain, n/v.        Vital Signs Last 24 Hrs  T(C): 36.6 (12 May 2018 09:40), Max: 37 (12 May 2018 05:00)  T(F): 97.8 (12 May 2018 09:40), Max: 98.6 (12 May 2018 05:00)  HR: 62 (12 May 2018 08:38) (62 - 96)  BP: 119/59 (12 May 2018 08:38) (112/65 - 146/74)  BP(mean): 83 (12 May 2018 08:38) (80 - 102)  RR: 16 (12 May 2018 08:38) (16 - 27)  SpO2: 97% (12 May 2018 08:38) (97% - 100%)  I&O's Detail    11 May 2018 07:01  -  12 May 2018 07:00  --------------------------------------------------------  IN:    Oral Fluid: 200 mL  Total IN: 200 mL    OUT:    Chest Tube: 320 mL    Indwelling Catheter - Urethral: 550 mL  Total OUT: 870 mL    Total NET: -670 mL      12 May 2018 07:01  -  12 May 2018 10:35  --------------------------------------------------------  IN:  Total IN: 0 mL    OUT:    Chest Tube: 30 mL  Total OUT: 30 mL    Total NET: -30 mL          CHEST TUBE:  Yes x 1, on suction with 1/5 AL  LEYLA DRAIN:  No.  EPICARDIAL WIRES:No.  TIE DOWNS: Yes  NATION: No.    PHYSICAL EXAM:    General: Patient lying comfortably in bed, no acute distress     Neurological: Alert and oriented. No focal neurological deficits     Cardiovascular: S1S2, RRR, no murmurs appreciated on exam     Respiratory: + scattered rhonchi, no wheeze/rales    Gastrointestinal: Abdomen soft, non tender, non distended     Extremities: Warm and well perfused. No edema or calf tenderness     Vascular: 2+ Peripheral pulses     Incision Sites: L VATS: CDI. L CT Site: CDI, no drainage.    LABS:                        15.2   13.1  )-----------( 257      ( 12 May 2018 07:04 )             44.8       COUMADIN:  No    PT/INR - ( 12 May 2018 07:04 )   PT: 12.0 sec;   INR: 1.08          PTT - ( 12 May 2018 07:04 )  PTT:28.7 sec    05-12    138  |  99  |  15  ----------------------------<  148<H>  4.2   |  24  |  0.80    Ca    8.8      12 May 2018 07:04  Phos  4.1     05-12  Mg     2.1     05-12            MEDICATIONS  (STANDING):  atorvastatin 10 milliGRAM(s) Oral at bedtime  BUpivacaine liposome 1.3% Injectable (no eMAR) 20 milliLiter(s) Local Injection once  ceFAZolin  Injectable. 2000 milliGRAM(s) IV Push every 8 hours  docusate sodium 100 milliGRAM(s) Oral three times a day  heparin  Injectable 5000 Unit(s) SubCutaneous every 12 hours  lactated ringers. 1000 milliLiter(s) (30 mL/Hr) IV Continuous <Continuous>  pantoprazole    Tablet 40 milliGRAM(s) Oral before breakfast  senna 2 Tablet(s) Oral at bedtime    MEDICATIONS  (PRN):  ketorolac   Injectable 15 milliGRAM(s) IV Push every 8 hours PRN Moderate Pain        RADIOLOGY & ADDITIONAL TESTS:  < from: Xray Chest 1 View- PORTABLE-Routine (05.12.18 @ 07:36) >  Portable examination chest demonstrates the heart to be within normal   limits in transverse diameter. No acute infiltrates. Left chest tube   noted. Subcutaneous emphysema overlying soft tissue left neck with   improvement in comparison to prior examination of the chest 5/11/2018.    Impression: No acute infiltrates     < end of copied text >

## 2018-05-12 NOTE — PROGRESS NOTE ADULT - ASSESSMENT
72 year old male, former smoker, PMHx HLD, gastric ulcer, bilateral inguinal hernia s/p repair 2009 referred to Dr. Weber as an outpatient for finding of new LLL lung nodule. Patient presented today for elective surgery and underwent L VATS LLL segmentectomy Procedure uncomplicated. POD1 CT with 1/5 AL, continue to suction.     Neurovascular: Stable. Pain well controlled with current regimen.  - continue tylenol and toradol PRN     Cardiovascular: Hemodynamically stable. HR controlled.  - HLD, continue atorvastatin 10mg qhs     Respiratory: 02 Sat = 97% RA   - Continue to wean O2 to maintain SaO2 > 93%   - CXR stable, CT in place, no obvious PTX, results above  - Left pleural chest tube in place on suction with 1/5 AL, continue to suction f/u AM CXR per Dr. Hilliard  - Encourage C+DB and Use of IS 10x / hr while awake.    GI: Stable.  -PO Diet  - protonix 40mg for GI ppx   - continue bowel regimen     Renal / : Cr 0.80, no active issues   - Monitor renal function.  - Monitor I/O's.  -thao remove f/u TOV     Endocrine: hgba1c 5.9, TSH 0.994   - no active issues     Hematologic: H&H 15/44  -f/u AM CBC.    ID: afebrile, WBC 13.1 from 18.5  -complete periop abx  -Observe for SIRS/Sepsis Syndrome.    Prophylaxis:  -DVT prophylaxis with 5000 SubQ Heparin q8h.  -SCD's    Disposition:   -Home when ready

## 2018-05-13 PROCEDURE — 71045 X-RAY EXAM CHEST 1 VIEW: CPT | Mod: 26,77

## 2018-05-13 PROCEDURE — 71045 X-RAY EXAM CHEST 1 VIEW: CPT | Mod: 26

## 2018-05-13 RX ORDER — MULTIVIT WITH MIN/MFOLATE/K2 340-15/3 G
200 POWDER (GRAM) ORAL ONCE
Qty: 0 | Refills: 0 | Status: COMPLETED | OUTPATIENT
Start: 2018-05-13 | End: 2018-05-13

## 2018-05-13 RX ADMIN — HEPARIN SODIUM 5000 UNIT(S): 5000 INJECTION INTRAVENOUS; SUBCUTANEOUS at 06:06

## 2018-05-13 RX ADMIN — PANTOPRAZOLE SODIUM 40 MILLIGRAM(S): 20 TABLET, DELAYED RELEASE ORAL at 06:05

## 2018-05-13 RX ADMIN — Medication 100 MILLIGRAM(S): at 06:06

## 2018-05-13 RX ADMIN — Medication 100 MILLIGRAM(S): at 11:44

## 2018-05-13 RX ADMIN — Medication 200 MILLILITER(S): at 11:44

## 2018-05-13 RX ADMIN — SENNA PLUS 2 TABLET(S): 8.6 TABLET ORAL at 21:17

## 2018-05-13 RX ADMIN — Medication 100 MILLIGRAM(S): at 21:17

## 2018-05-13 RX ADMIN — ATORVASTATIN CALCIUM 10 MILLIGRAM(S): 80 TABLET, FILM COATED ORAL at 21:17

## 2018-05-13 RX ADMIN — HEPARIN SODIUM 5000 UNIT(S): 5000 INJECTION INTRAVENOUS; SUBCUTANEOUS at 17:12

## 2018-05-13 NOTE — PROGRESS NOTE ADULT - SUBJECTIVE AND OBJECTIVE BOX
Patient discussed on morning rounds with Dr. Weber      Operation / Date: 5/11/18 L VATS RA LLL wedge resections and subsequent basilar segmentectomy     SUBJECTIVE ASSESSMENT:  Patient seen this morning at bedside, doing well and not offering any complaints at this time. Denies any chest pain, shortness of breath or palpitations. Patient has not had a bowel movement since surgery, is passing gas and tolerating PO without any nausea or vomiting.     Vital Signs Last 24 Hrs  T(C): 37 (13 May 2018 13:55), Max: 37 (12 May 2018 17:11)  T(F): 98.6 (13 May 2018 13:55), Max: 98.6 (12 May 2018 17:11)  HR: 68 (13 May 2018 11:44) (64 - 72)  BP: 128/74 (13 May 2018 11:44) (120/70 - 144/71)  BP(mean): 94 (13 May 2018 11:44) (90 - 102)  RR: 14 (13 May 2018 11:44) (12 - 17)  SpO2: 96% (13 May 2018 11:44) (95% - 98%)  I&O's Detail    12 May 2018 07:01  -  13 May 2018 07:00  --------------------------------------------------------  IN:    Oral Fluid: 250 mL  Total IN: 250 mL    OUT:    Chest Tube: 410 mL    Voided: 770 mL  Total OUT: 1180 mL    Total NET: -930 mL      13 May 2018 07:01  -  13 May 2018 15:31  --------------------------------------------------------  IN:  Total IN: 0 mL    OUT:    Chest Tube: 160 mL  Total OUT: 160 mL    Total NET: -160 mL          CHEST TUBE: Yes on suction, intermittent 1/5 air leak   LEYLA DRAIN:  No  EPICARDIAL WIRES: No  TIE DOWNS: No  NATION: No    PHYSICAL EXAM:    General: Patient sitting comfortably in bed, no acute distress     Neurological: Alert and oriented. No focal neurological deficits     Cardiovascular: S1S2, RRR, no murmurs appreciated on exam     Respiratory: Clear to ausculation bilaterally     Gastrointestinal: Abdomen soft, non tender, non distended     Extremities: Warm and well perfused. No edema or calf tenderness     Vascular: Peripheral pulses 2+ bilaterally     Incision Sites:  L VATS incisions C/D/I, no drainage or surrounding erythema   Chest tube secured in place, C/D/I        LABS:                        15.2   13.1  )-----------( 257      ( 12 May 2018 07:04 )             44.8       COUMADIN:  No    PT/INR - ( 12 May 2018 07:04 )   PT: 12.0 sec;   INR: 1.08          PTT - ( 12 May 2018 07:04 )  PTT:28.7 sec    05-12    138  |  99  |  15  ----------------------------<  148<H>  4.2   |  24  |  0.80    Ca    8.8      12 May 2018 07:04  Phos  4.1     05-12  Mg     2.1     05-12      MEDICATIONS  (STANDING):  atorvastatin 10 milliGRAM(s) Oral at bedtime  docusate sodium 100 milliGRAM(s) Oral three times a day  heparin  Injectable 5000 Unit(s) SubCutaneous every 12 hours  pantoprazole    Tablet 40 milliGRAM(s) Oral before breakfast  senna 2 Tablet(s) Oral at bedtime    MEDICATIONS  (PRN):  acetaminophen   Tablet. 650 milliGRAM(s) Oral every 6 hours PRN Mild Pain (1 - 3)  ketorolac   Injectable 15 milliGRAM(s) IV Push every 8 hours PRN Moderate Pain  polyethylene glycol 3350 17 Gram(s) Oral daily PRN Constipation    RADIOLOGY & ADDITIONAL TESTS:  5/13/18 CXR: < from: Xray Chest 1 View- PORTABLE-Routine (05.13.18 @ 07:34) >  Impression: No acute infiltrates    < end of copied text >  A/P: 72 year old male, former smoker, PMHx HLD, gastric ulcer, bilateral inguinal hernia s/p repair 2009 referred to Dr. Weber as an outpatient for finding of new LLL lung nodule. Patient presented for elective surgery and underwent L VATS LLL segmentectomy on 5/11/18.  Procedure and post operative course uncomplicated thus far.     Neurovascular: Stable. Pain well controlled with current regimen.  - continue tylenol and toradol PRN     Cardiovascular: Hemodynamically stable. HR controlled.  - HLD, continue atorvastatin 10mg qhs     Respiratory: 02 Sat = 96% on RA   - left pleural chest tube in place with intermittent 1/5 air leak. Per Dr. Weber placed on waterseal this afternoon will follow up repeat CXR at 7:30 PM.   - Encourage C+DB and Use of IS 10x / hr while awake.    GI: Stable.  - Continue PO diet   - continue protonix 40mg for GI ppx   - continue bowel regimen     Renal / : Cr 0.80, no active issues   - Monitor renal function.  - Monitor I/O's.    Endocrine: hgba1c 5.9, TSH 0.994   - no active issues     Prophylaxis:  -DVT prophylaxis with 5000 SubQ Heparin q8h.  -SCD's    Disposition: Home pending chest tube management

## 2018-05-14 PROCEDURE — 71045 X-RAY EXAM CHEST 1 VIEW: CPT | Mod: 26

## 2018-05-14 RX ADMIN — HEPARIN SODIUM 5000 UNIT(S): 5000 INJECTION INTRAVENOUS; SUBCUTANEOUS at 05:35

## 2018-05-14 RX ADMIN — ATORVASTATIN CALCIUM 10 MILLIGRAM(S): 80 TABLET, FILM COATED ORAL at 21:09

## 2018-05-14 RX ADMIN — PANTOPRAZOLE SODIUM 40 MILLIGRAM(S): 20 TABLET, DELAYED RELEASE ORAL at 06:23

## 2018-05-14 RX ADMIN — HEPARIN SODIUM 5000 UNIT(S): 5000 INJECTION INTRAVENOUS; SUBCUTANEOUS at 17:08

## 2018-05-14 RX ADMIN — Medication 100 MILLIGRAM(S): at 05:35

## 2018-05-14 NOTE — PROGRESS NOTE ADULT - ASSESSMENT
A/P: 72 year old male, former smoker, PMHx HLD, gastric ulcer, bilateral inguinal hernia s/p repair 2009 referred to Dr. Weber as an outpatient for finding of new LLL lung nodule. Patient presented for elective surgery and underwent L VATS LLL segmentectomy on 5/11/18.  Procedure and post operative course uncomplicated thus far.     Neurovascular: Stable. Pain well controlled with current regimen.  - continue Tylenol 650mg PO q6hrs PRN and Toradol 15mg IVP q8hrs PRN     Cardiovascular: Hemodynamically stable. HR controlled.  - HLD, continue Atorvastatin 10mg PO qhs     Respiratory: 02 Sat = 96% on RA   - left pleural chest tube in place with intermittent 1/5 air leak. [facility] will follow up repeat CXR in AM.   - Encourage C+DB and Use of IS 10x / hr while awake.    GI: Stable.  - Continue PO diet   - continue protonix 40mg for GI ppx   - continue bowel regimen     Renal / : Cr 0.80, no active issues   - Monitor renal function.  - Monitor I/O's.    Endocrine: hgba1c 5.9, TSH 0.994   - no active issues     Prophylaxis:  -DVT prophylaxis with 5000 SubQ Heparin q8h.  -SCD's    Disposition: Home pending chest tube management

## 2018-05-14 NOTE — PROGRESS NOTE ADULT - SUBJECTIVE AND OBJECTIVE BOX
Patient discussed on morning rounds with Dr. Pelaez    Operation / Date:  5/11/18 L VATS RA LLL wedge resections and subsequent basilar segmentectomy     SUBJECTIVE ASSESSMENT:  71 y/o Male seen and examined bedside. Patient is doing well and not offering any complaints. Patient is pulling 1500IS, last BM was yesterday, tolerating PO diet, and ambulating multiple times today. Denies chest pain, palpitations, SOB, N/V/D, abdominal pain, fever or chills.        Vital Signs Last 24 Hrs  T(C): 36.9 (14 May 2018 16:36), Max: 37.3 (13 May 2018 20:43)  T(F): 98.4 (14 May 2018 16:36), Max: 99.2 (13 May 2018 20:43)  HR: 77 (14 May 2018 17:22) (68 - 86)  BP: 126/70 (14 May 2018 17:22) (101/59 - 128/77)  BP(mean): 90 (14 May 2018 17:22) (74 - 97)  RR: 18 (14 May 2018 17:22) (16 - 18)  SpO2: 96% (14 May 2018 17:22) (96% - 97%)  I&O's Detail    13 May 2018 07:01  -  14 May 2018 07:00  --------------------------------------------------------  IN:  Total IN: 0 mL    OUT:    Chest Tube: 370 mL  Total OUT: 370 mL    Total NET: -370 mL      14 May 2018 07:01  -  14 May 2018 19:14  --------------------------------------------------------  IN:    Oral Fluid: 540 mL  Total IN: 540 mL    OUT:    Chest Tube: 15 mL  Total OUT: 15 mL    Total NET: 525 mL          CHEST TUBE:  Yes AIR LEAKS: Yes H2O SEAL.   LEYLA DRAIN:  No.  EPICARDIAL WIRES: No.  TIE DOWNS: No.  NATION: No.    PHYSICAL EXAM:    General: Patient sitting comfortably in his chair, in NAD.    Neurological: A&Ox3, no new focal deficits.     Cardiovascular: S1S2, RRR, no murmurs appreciated on exam    Respiratory: Left lower base crackles, otherwise clear to auscultation.     Gastrointestinal: Abdomen soft, non tender, non distended    Extremities: Warm and well perfused, no peripheral edema, no calf tenderness.    Vascular: Peripheral pulses palpable bilaterally.    Incision Sites: L VATS incisions C/D/I, no drainage or surrounding erythema   Chest tube secured in place, C/D/I     LABS:      COUMADIN:  No      MEDICATIONS  (STANDING):  atorvastatin 10 milliGRAM(s) Oral at bedtime  docusate sodium 100 milliGRAM(s) Oral three times a day  heparin  Injectable 5000 Unit(s) SubCutaneous every 12 hours  pantoprazole    Tablet 40 milliGRAM(s) Oral before breakfast  senna 2 Tablet(s) Oral at bedtime    MEDICATIONS  (PRN):  acetaminophen   Tablet. 650 milliGRAM(s) Oral every 6 hours PRN Mild Pain (1 - 3)  ketorolac   Injectable 15 milliGRAM(s) IV Push every 8 hours PRN Moderate Pain  polyethylene glycol 3350 17 Gram(s) Oral daily PRN Constipation        RADIOLOGY & ADDITIONAL TESTS:    < from: Xray Chest 1 View- PORTABLE-Routine (05.13.18 @ 07:34) >    INTERPRETATION:  Clinical History: VATS    Portable examination the chest demonstrates the heart to be within normal   limits in transversediameter. Left chest tube noted. Subcutaneous   emphysema overlying soft tissue left neck and shoulder. No acute   infiltrates.    Impression: No acute infiltrates      < end of copied text >

## 2018-05-15 ENCOUNTER — TRANSCRIPTION ENCOUNTER (OUTPATIENT)
Age: 73
End: 2018-05-15

## 2018-05-15 VITALS
OXYGEN SATURATION: 96 % | HEART RATE: 78 BPM | RESPIRATION RATE: 16 BRPM | SYSTOLIC BLOOD PRESSURE: 137 MMHG | DIASTOLIC BLOOD PRESSURE: 74 MMHG

## 2018-05-15 PROBLEM — R91.1 SOLITARY PULMONARY NODULE: Chronic | Status: ACTIVE | Noted: 2018-05-10

## 2018-05-15 PROBLEM — R06.02 SHORTNESS OF BREATH: Chronic | Status: ACTIVE | Noted: 2018-05-10

## 2018-05-15 PROBLEM — E78.5 HYPERLIPIDEMIA, UNSPECIFIED: Chronic | Status: ACTIVE | Noted: 2018-05-10

## 2018-05-15 LAB
ANION GAP SERPL CALC-SCNC: 10 MMOL/L — SIGNIFICANT CHANGE UP (ref 5–17)
BUN SERPL-MCNC: 15 MG/DL — SIGNIFICANT CHANGE UP (ref 7–23)
CALCIUM SERPL-MCNC: 8.7 MG/DL — SIGNIFICANT CHANGE UP (ref 8.4–10.5)
CHLORIDE SERPL-SCNC: 98 MMOL/L — SIGNIFICANT CHANGE UP (ref 96–108)
CO2 SERPL-SCNC: 26 MMOL/L — SIGNIFICANT CHANGE UP (ref 22–31)
CREAT SERPL-MCNC: 0.7 MG/DL — SIGNIFICANT CHANGE UP (ref 0.5–1.3)
GLUCOSE SERPL-MCNC: 121 MG/DL — HIGH (ref 70–99)
HCT VFR BLD CALC: 45.9 % — SIGNIFICANT CHANGE UP (ref 39–50)
HGB BLD-MCNC: 15.7 G/DL — SIGNIFICANT CHANGE UP (ref 13–17)
MAGNESIUM SERPL-MCNC: 2.2 MG/DL — SIGNIFICANT CHANGE UP (ref 1.6–2.6)
MCHC RBC-ENTMCNC: 31.3 PG — SIGNIFICANT CHANGE UP (ref 27–34)
MCHC RBC-ENTMCNC: 34.2 G/DL — SIGNIFICANT CHANGE UP (ref 32–36)
MCV RBC AUTO: 91.4 FL — SIGNIFICANT CHANGE UP (ref 80–100)
PLATELET # BLD AUTO: 276 K/UL — SIGNIFICANT CHANGE UP (ref 150–400)
POTASSIUM SERPL-MCNC: 4.3 MMOL/L — SIGNIFICANT CHANGE UP (ref 3.5–5.3)
POTASSIUM SERPL-SCNC: 4.3 MMOL/L — SIGNIFICANT CHANGE UP (ref 3.5–5.3)
RBC # BLD: 5.02 M/UL — SIGNIFICANT CHANGE UP (ref 4.2–5.8)
RBC # FLD: 12.8 % — SIGNIFICANT CHANGE UP (ref 10.3–16.9)
SODIUM SERPL-SCNC: 134 MMOL/L — LOW (ref 135–145)
WBC # BLD: 10.1 K/UL — SIGNIFICANT CHANGE UP (ref 3.8–10.5)
WBC # FLD AUTO: 10.1 K/UL — SIGNIFICANT CHANGE UP (ref 3.8–10.5)

## 2018-05-15 PROCEDURE — 71045 X-RAY EXAM CHEST 1 VIEW: CPT | Mod: 26

## 2018-05-15 PROCEDURE — 88342 IMHCHEM/IMCYTCHM 1ST ANTB: CPT

## 2018-05-15 PROCEDURE — 88331 PATH CONSLTJ SURG 1 BLK 1SPC: CPT

## 2018-05-15 PROCEDURE — 86850 RBC ANTIBODY SCREEN: CPT

## 2018-05-15 PROCEDURE — 88307 TISSUE EXAM BY PATHOLOGIST: CPT

## 2018-05-15 PROCEDURE — 88313 SPECIAL STAINS GROUP 2: CPT

## 2018-05-15 PROCEDURE — 88332 PATH CONSLTJ SURG EA ADD BLK: CPT

## 2018-05-15 PROCEDURE — 85027 COMPLETE CBC AUTOMATED: CPT

## 2018-05-15 PROCEDURE — 86900 BLOOD TYPING SEROLOGIC ABO: CPT

## 2018-05-15 PROCEDURE — 83735 ASSAY OF MAGNESIUM: CPT

## 2018-05-15 PROCEDURE — 82803 BLOOD GASES ANY COMBINATION: CPT

## 2018-05-15 PROCEDURE — 85730 THROMBOPLASTIN TIME PARTIAL: CPT

## 2018-05-15 PROCEDURE — 71045 X-RAY EXAM CHEST 1 VIEW: CPT

## 2018-05-15 PROCEDURE — 86901 BLOOD TYPING SEROLOGIC RH(D): CPT

## 2018-05-15 PROCEDURE — 36415 COLL VENOUS BLD VENIPUNCTURE: CPT

## 2018-05-15 PROCEDURE — 85610 PROTHROMBIN TIME: CPT

## 2018-05-15 PROCEDURE — 88360 TUMOR IMMUNOHISTOCHEM/MANUAL: CPT

## 2018-05-15 PROCEDURE — S2900: CPT

## 2018-05-15 PROCEDURE — 88341 IMHCHEM/IMCYTCHM EA ADD ANTB: CPT

## 2018-05-15 PROCEDURE — 88305 TISSUE EXAM BY PATHOLOGIST: CPT

## 2018-05-15 PROCEDURE — 88309 TISSUE EXAM BY PATHOLOGIST: CPT

## 2018-05-15 PROCEDURE — 80048 BASIC METABOLIC PNL TOTAL CA: CPT

## 2018-05-15 PROCEDURE — 84100 ASSAY OF PHOSPHORUS: CPT

## 2018-05-15 PROCEDURE — C1889: CPT

## 2018-05-15 RX ORDER — ATORVASTATIN CALCIUM 80 MG/1
1 TABLET, FILM COATED ORAL
Qty: 30 | Refills: 0 | OUTPATIENT
Start: 2018-05-15 | End: 2018-06-13

## 2018-05-15 RX ORDER — ASPIRIN/CALCIUM CARB/MAGNESIUM 324 MG
1 TABLET ORAL
Qty: 30 | Refills: 0 | OUTPATIENT
Start: 2018-05-15 | End: 2018-06-13

## 2018-05-15 RX ORDER — ATORVASTATIN CALCIUM 80 MG/1
1 TABLET, FILM COATED ORAL
Qty: 0 | Refills: 0 | COMMUNITY

## 2018-05-15 RX ORDER — ICOSAPENT ETHYL 500 MG/1
2 CAPSULE, LIQUID FILLED ORAL
Qty: 120 | Refills: 0 | OUTPATIENT
Start: 2018-05-15 | End: 2018-06-13

## 2018-05-15 RX ORDER — ASPIRIN/CALCIUM CARB/MAGNESIUM 324 MG
1 TABLET ORAL
Qty: 0 | Refills: 0 | COMMUNITY

## 2018-05-15 RX ORDER — ICOSAPENT ETHYL 500 MG/1
2 CAPSULE, LIQUID FILLED ORAL
Qty: 0 | Refills: 0 | COMMUNITY

## 2018-05-15 RX ORDER — ACETAMINOPHEN 500 MG
2 TABLET ORAL
Qty: 240 | Refills: 0 | OUTPATIENT
Start: 2018-05-15 | End: 2018-06-13

## 2018-05-15 RX ADMIN — Medication 15 MILLIGRAM(S): at 01:05

## 2018-05-15 RX ADMIN — Medication 15 MILLIGRAM(S): at 00:47

## 2018-05-15 RX ADMIN — HEPARIN SODIUM 5000 UNIT(S): 5000 INJECTION INTRAVENOUS; SUBCUTANEOUS at 05:44

## 2018-05-15 RX ADMIN — PANTOPRAZOLE SODIUM 40 MILLIGRAM(S): 20 TABLET, DELAYED RELEASE ORAL at 06:01

## 2018-05-15 NOTE — DISCHARGE NOTE ADULT - MEDICATION SUMMARY - MEDICATIONS TO TAKE
I will START or STAY ON the medications listed below when I get home from the hospital:    acetaminophen 325 mg oral tablet  -- 2 tab(s) by mouth every 6 hours, As needed, Mild Pain (1 - 3) MDD:Do not exceed 4000mg  -- Indication: For For pain    aspirin 81 mg oral tablet  -- 1 tab(s) by mouth once a day  -- Indication: For Blood thinner    cetirizine 10 mg oral tablet  -- 1 tab(s) by mouth once a day, As Needed  -- Indication: For allergies    Lipitor 10 mg oral tablet  -- 1 tab(s) by mouth once a day  -- Indication: For cholesterol    Vascepa 1 g oral capsule  -- 2 cap(s) by mouth 2 times a day   -- Indication: For cholesterol    Vitamin D3 50,000 intl units oral capsule  -- 1 cap(s) by mouth once a week  -- Indication: For vitamins

## 2018-05-15 NOTE — DISCHARGE NOTE ADULT - CARE PROVIDERS DIRECT ADDRESSES
[email or web address]

## 2018-05-15 NOTE — DISCHARGE NOTE ADULT - CARE PLAN
Goal:	To follow up after surgery  Assessment and plan of treatment:	-Please follow up with  ___on ___.  The office is located at [facility], [facility], 4th floor. Call us with any questions #[phone].    -Walk daily as tolerated and use your incentive spirometer every hour.    -No driving or strenuous activity/exercise for 6 weeks, or until cleared by your surgeon.    -Gently clean your incisions with anti-bacterial soap and water, pat dry.  You may leave them open to air.    -Call your doctor if you have shortness of breath, chest pain not relieved by pain medication, dizziness, fever >101.5, or increased redness or drainage from incisions.  Goal:	To follow up  Assessment and plan of treatment:	- Please follow up with     on      Address and phone number are below: Principal Discharge DX:	Lung nodule  Goal:	To follow up after surgery  Assessment and plan of treatment:	-Please follow up with Dr. Weber on Friday 5/25/18 at 10:30am.  The office is located at [facility], [facility], 4th floor. Call us with any questions #[phone].    -Walk daily as tolerated and use your incentive spirometer every hour.    -No driving or strenuous activity/exercise for 6 weeks, or until cleared by your surgeon.    -Gently clean your incisions with anti-bacterial soap and water, pat dry.  You may leave them open to air.    -Call your doctor if you have shortness of breath, chest pain not relieved by pain medication, dizziness, fever >101.5, or increased redness or drainage from incisions.  Secondary Diagnosis:	HLD (hyperlipidemia)  Goal:	To follow up with PCP  Assessment and plan of treatment:	- Please follow up with Dr. Baltazar Nguyen in 1-2 weeks   Address and phone number are below:  [address] [place] [address] [address], [place] [address]  Phone: [phone]

## 2018-05-15 NOTE — DISCHARGE NOTE ADULT - PATIENT PORTAL LINK FT
You can access the [facility] Patient Portal, offered by [facility], by registering with the following website: [email or web address]

## 2018-05-15 NOTE — DISCHARGE NOTE ADULT - NS AS ACTIVITY OBS
Stairs allowed/Walking-Outdoors allowed/Showering allowed/No Heavy lifting/straining/Walking-Indoors allowed

## 2018-05-15 NOTE — DISCHARGE NOTE ADULT - HOSPITAL COURSE
71 y/o male, former smoker x 30 years (1PPD; Quit 6 yrs ago), with a PMHx of HLD, gastric ulcer, B/L inguinal hernia in 1995 in [place], Rt and Lt inguinal hernia repair 2009 who was referred to Dr. Weber by Dr. Baltazar Nguyen for evaluation of LLL lung nodule.  CT chest that was performed on 4/7/18 revealed a LLL nodule which is new since 2014.  Patient was evaluated by Dr. Weber and was deemed a good surgical candidate. He electively presented to [facility] and on 5/11/18, patient had Robotic assisted, left multiple wedge resections with left lower lobe basilar segmentectomy, and lymph node biopsy, frozen pathology +adenocarcinoma. Patient had an uncomplicated post-operative course. On  POD#4, patient is doing well, ambulating multiple times, voiding spontaneously, and tolerating PO diet. Patient denies chest pain, palpitations, SOB, N/V/D, fever/chills, or dizziness. Patient is hemodynamically stable, heart rate is controlled, and as per Ct Surgery team, patient is medically stable for discharge. 71 y/o male, former smoker x 30 years (1PPD; Quit 6 yrs ago), with a PMHx of HLD, gastric ulcer, B/L inguinal hernia in 1995 in [place], Rt and Lt inguinal hernia repair 2009 who was referred to Dr. Weber by Dr. Baltazar Nguyen for evaluation of LLL lung nodule.  CT chest that was performed on 4/7/18 revealed a LLL nodule which is new since 2014.  Patient was evaluated by Dr. Weber and was deemed a good surgical candidate. He electively presented to [facility] and on 5/11/18, patient had Robotic assisted, left multiple wedge resections with left lower lobe basilar segmentectomy, and lymph node biopsy, frozen pathology +adenocarcinoma. Patient had an uncomplicated post-operative course. On POD#4, chest tube was removed and f/u CXR is stable, patient is doing well, ambulating multiple times, voiding spontaneously, and tolerating PO diet. Patient denies chest pain, palpitations, SOB, N/V/D, fever/chills, or dizziness. Patient is hemodynamically stable, heart rate is controlled, and as per Ct Surgery team, patient is medically stable for discharge.

## 2018-05-15 NOTE — DISCHARGE NOTE ADULT - PROVIDER TOKENS
HONORIO:'[id number]' TOKEN:'[id number]',FREE:[LAST:[Patrick],FIRST:[Baltazar],PHONE:[phone]],FAX:[(   )    -],ADDRESS:[[address] [place] [address] [address], [place] [address]]]

## 2018-05-15 NOTE — PROGRESS NOTE ADULT - ASSESSMENT
-Please follow up with Dr. Weber on Friday 5/25/18 at 10:30am.    - Please follow up with Dr. Baltazar Nguyen in 1-2 weeks.  Address and phone number are below:  [address] [place] [address] [address], [place] [address]  Phone: [phone]

## 2018-05-15 NOTE — DISCHARGE NOTE ADULT - CARE PROVIDER_API CALL
Ghassan Weber), Surgery; Thoracic Surgery  [address] [address] [place] [address]  [place] [address]  Phone: [phone]  Fax: [phone] Ghassan Weber), Surgery; Thoracic Surgery  [address] [place] [address]  [place] [address]  Phone: [phone]  Fax: [phone]    Baltazar Nguyen  [address] [place] [address] [address], [place] [address]  Phone: [phone]  Fax: (       -

## 2018-05-15 NOTE — PROGRESS NOTE ADULT - SUBJECTIVE AND OBJECTIVE BOX
Patient discussed on morning rounds with Dr. Pelaez/ Dr. Joseph    Operation / Date: 5/11/18 L VATS RA LLL wedge resections and subsequent basilar segmentectomy     Surgeon: Dr. Weber    Referring Physician: Dr. Baltazar Nguyen    SUBJECTIVE ASSESSMENT:   71 y/o Male seen and examined bedside. Patient is doing well and not offering any complaints. Patient is pulling 1500IS, last BM was yesterday, tolerating PO diet, and ambulating multiple times today. Denies chest pain, palpitations, SOB, N/V/D, abdominal pain, fever or chills.    AND HOSPITAL COURSE:  71 y/o male, former smoker x 30 years (1PPD; Quit 6 yrs ago), with a PMHx of HLD, gastric ulcer, B/L inguinal hernia in 1995 in [place], Rt and Lt inguinal hernia repair 2009 who was referred to Dr. Weber by Dr. Baltazar gNuyen for evaluation of LLL lung nodule.  CT chest that was performed on 4/7/18 revealed a LLL nodule which is new since 2014.  Patient was evaluated by Dr. Weber and was deemed a good surgical candidate. He electively presented to [facility] and on 5/11/18, patient had Robotic assisted, left multiple wedge resections with left lower lobe basilar segmentectomy, and lymph node biopsy, frozen pathology +adenocarcinoma. Patient had an uncomplicated post-operative course. On POD#4, chest tube was removed and f/u CXR is stable, patient is doing well, ambulating multiple times, voiding spontaneously, and tolerating PO diet. Patient denies chest pain, palpitations, SOB, N/V/D, fever/chills, or dizziness. Patient is hemodynamically stable, heart rate is controlled, and as per Ct Surgery team, patient is medically stable for discharge.     Vital Signs Last 24 Hrs  T(C): 36.2 (15 May 2018 14:00), Max: 36.9 (14 May 2018 16:36)  T(F): 97.2 (15 May 2018 14:00), Max: 98.5 (14 May 2018 21:21)  HR: 76 (15 May 2018 14:23) (62 - 78)  BP: 119/71 (15 May 2018 14:23) (112/71 - 127/70)  BP(mean): 87 (15 May 2018 14:23) (85 - 93)  RR: 16 (15 May 2018 14:23) (16 - 18)  SpO2: 96% (15 May 2018 14:23) (93% - 98%)    EPICARDIAL WIRES REMOVED: N/A  TIE DOWNS REMOVED: No. 1 remains.    PHYSICAL EXAM:    General: Patient sitting comfortably in his chair, in NAD.    Neurological: A&Ox3, no new focal deficits.     Cardiovascular: S1S2, RRR, no murmurs appreciated on exam    Respiratory: Left lower base crackles, otherwise clear to auscultation.     Gastrointestinal: Abdomen soft, non tender, non distended    Extremities: Warm and well perfused, no peripheral edema, no calf tenderness.    Vascular: Peripheral pulses palpable bilaterally.    Incision Sites: L VATS incisions C/D/I, no drainage or surrounding erythema   Chest tube incision C/D/I with one tiedown in place.    LABS:                        15.7   10.1  )-----------( 276      ( 15 May 2018 06:12 )             45.9       COUMADIN:  No.            05-15    134<L>  |  98  |  15  ----------------------------<  121<H>  4.3   |  26  |  0.70    Ca    8.7      15 May 2018 06:12  Mg     2.2     05-15            Discharge CXR: < from: Xray Chest 1 View- PORTABLE-Routine (05.13.18 @ 07:34) >      INTERPRETATION:  Clinical History: VATS    Portable examination the chest demonstrates the heart to be within normal   limits in transversediameter. Left chest tube noted. Subcutaneous   emphysema overlying soft tissue left neck and shoulder. No acute   infiltrates.    Impression: No acute infiltrates      < end of copied text >      Discharge ECHO: not needed.

## 2018-05-15 NOTE — DISCHARGE NOTE ADULT - PLAN OF CARE
To follow up after surgery -Please follow up with  ___on ___.  The office is located at [facility], [facility], 4th floor. Call us with any questions #[phone].    -Walk daily as tolerated and use your incentive spirometer every hour.    -No driving or strenuous activity/exercise for 6 weeks, or until cleared by your surgeon.    -Gently clean your incisions with anti-bacterial soap and water, pat dry.  You may leave them open to air.    -Call your doctor if you have shortness of breath, chest pain not relieved by pain medication, dizziness, fever >101.5, or increased redness or drainage from incisions. To follow up - Please follow up with     on      Address and phone number are below: -Please follow up with Dr. Weber on Friday 5/25/18 at 10:30am.  The office is located at [facility], [facility], 4th floor. Call us with any questions #[phone].    -Walk daily as tolerated and use your incentive spirometer every hour.    -No driving or strenuous activity/exercise for 6 weeks, or until cleared by your surgeon.    -Gently clean your incisions with anti-bacterial soap and water, pat dry.  You may leave them open to air.    -Call your doctor if you have shortness of breath, chest pain not relieved by pain medication, dizziness, fever >101.5, or increased redness or drainage from incisions. To follow up with PCP - Please follow up with Dr. Baltazar Nguyen in 1-2 weeks   Address and phone number are below:  [address] [place] [address] [address], [place] [address]  Phone: [phone]

## 2018-05-17 LAB — SURGICAL PATHOLOGY STUDY: SIGNIFICANT CHANGE UP

## 2018-05-18 DIAGNOSIS — Z98.890 OTHER SPECIFIED POSTPROCEDURAL STATES: ICD-10-CM

## 2018-05-18 DIAGNOSIS — E55.9 VITAMIN D DEFICIENCY, UNSPECIFIED: ICD-10-CM

## 2018-05-18 DIAGNOSIS — Z87.891 PERSONAL HISTORY OF NICOTINE DEPENDENCE: ICD-10-CM

## 2018-05-18 DIAGNOSIS — Z79.82 LONG TERM (CURRENT) USE OF ASPIRIN: ICD-10-CM

## 2018-05-18 DIAGNOSIS — J44.9 CHRONIC OBSTRUCTIVE PULMONARY DISEASE, UNSPECIFIED: ICD-10-CM

## 2018-05-18 DIAGNOSIS — Z87.11 PERSONAL HISTORY OF PEPTIC ULCER DISEASE: ICD-10-CM

## 2018-05-18 DIAGNOSIS — C34.32 MALIGNANT NEOPLASM OF LOWER LOBE, LEFT BRONCHUS OR LUNG: ICD-10-CM

## 2018-05-18 DIAGNOSIS — I25.10 ATHEROSCLEROTIC HEART DISEASE OF NATIVE CORONARY ARTERY WITHOUT ANGINA PECTORIS: ICD-10-CM

## 2018-05-18 DIAGNOSIS — M41.9 SCOLIOSIS, UNSPECIFIED: ICD-10-CM

## 2018-05-18 DIAGNOSIS — J98.4 OTHER DISORDERS OF LUNG: ICD-10-CM

## 2018-05-18 DIAGNOSIS — E78.5 HYPERLIPIDEMIA, UNSPECIFIED: ICD-10-CM

## 2018-05-22 PROBLEM — R91.1 LUNG NODULE: Status: RESOLVED | Noted: 2018-05-04 | Resolved: 2018-05-22

## 2018-05-24 ENCOUNTER — FORM ENCOUNTER (OUTPATIENT)
Age: 73
End: 2018-05-24

## 2018-05-25 ENCOUNTER — APPOINTMENT (OUTPATIENT)
Dept: THORACIC SURGERY | Facility: CLINIC | Age: 73
End: 2018-05-25
Payer: MEDICARE

## 2018-05-25 ENCOUNTER — OUTPATIENT (OUTPATIENT)
Dept: OUTPATIENT SERVICES | Facility: HOSPITAL | Age: 73
LOS: 1 days | End: 2018-05-25
Payer: MEDICARE

## 2018-05-25 VITALS
SYSTOLIC BLOOD PRESSURE: 109 MMHG | TEMPERATURE: 97.2 F | HEART RATE: 75 BPM | HEIGHT: 69 IN | RESPIRATION RATE: 18 BRPM | DIASTOLIC BLOOD PRESSURE: 67 MMHG | WEIGHT: 140 LBS | OXYGEN SATURATION: 96 % | BODY MASS INDEX: 20.73 KG/M2

## 2018-05-25 DIAGNOSIS — R91.1 SOLITARY PULMONARY NODULE: ICD-10-CM

## 2018-05-25 DIAGNOSIS — Z98.890 OTHER SPECIFIED POSTPROCEDURAL STATES: Chronic | ICD-10-CM

## 2018-05-25 PROCEDURE — 99024 POSTOP FOLLOW-UP VISIT: CPT

## 2018-05-25 PROCEDURE — 71046 X-RAY EXAM CHEST 2 VIEWS: CPT

## 2018-05-25 PROCEDURE — 71046 X-RAY EXAM CHEST 2 VIEWS: CPT | Mod: 26

## 2018-06-04 PROBLEM — J90 PLEURAL EFFUSION, LEFT: Status: RESOLVED | Noted: 2018-05-26 | Resolved: 2018-06-04

## 2018-06-08 ENCOUNTER — APPOINTMENT (OUTPATIENT)
Dept: THORACIC SURGERY | Facility: CLINIC | Age: 73
End: 2018-06-08
Payer: MEDICARE

## 2018-06-08 VITALS
SYSTOLIC BLOOD PRESSURE: 114 MMHG | TEMPERATURE: 97.2 F | BODY MASS INDEX: 20.73 KG/M2 | OXYGEN SATURATION: 95 % | RESPIRATION RATE: 18 BRPM | WEIGHT: 140 LBS | DIASTOLIC BLOOD PRESSURE: 69 MMHG | HEIGHT: 69 IN | HEART RATE: 79 BPM

## 2018-06-08 DIAGNOSIS — J90 PLEURAL EFFUSION, NOT ELSEWHERE CLASSIFIED: ICD-10-CM

## 2018-06-08 PROCEDURE — 99024 POSTOP FOLLOW-UP VISIT: CPT

## 2018-07-06 ENCOUNTER — APPOINTMENT (OUTPATIENT)
Dept: THORACIC SURGERY | Facility: CLINIC | Age: 73
End: 2018-07-06

## 2018-08-20 PROBLEM — Z09 POSTOP CHECK: Status: RESOLVED | Noted: 2018-05-22 | Resolved: 2018-08-20

## 2018-08-23 ENCOUNTER — FORM ENCOUNTER (OUTPATIENT)
Age: 73
End: 2018-08-23

## 2018-08-24 ENCOUNTER — OUTPATIENT (OUTPATIENT)
Dept: OUTPATIENT SERVICES | Facility: HOSPITAL | Age: 73
LOS: 1 days | End: 2018-08-24
Payer: MEDICARE

## 2018-08-24 ENCOUNTER — APPOINTMENT (OUTPATIENT)
Dept: THORACIC SURGERY | Facility: CLINIC | Age: 73
End: 2018-08-24
Payer: MEDICARE

## 2018-08-24 VITALS
OXYGEN SATURATION: 94 % | DIASTOLIC BLOOD PRESSURE: 72 MMHG | SYSTOLIC BLOOD PRESSURE: 135 MMHG | TEMPERATURE: 97.1 F | RESPIRATION RATE: 18 BRPM | HEART RATE: 60 BPM

## 2018-08-24 DIAGNOSIS — Z98.890 OTHER SPECIFIED POSTPROCEDURAL STATES: Chronic | ICD-10-CM

## 2018-08-24 DIAGNOSIS — R06.02 SHORTNESS OF BREATH: ICD-10-CM

## 2018-08-24 DIAGNOSIS — Z09 ENCOUNTER FOR FOLLOW-UP EXAMINATION AFTER COMPLETED TREATMENT FOR CONDITIONS OTHER THAN MALIGNANT NEOPLASM: ICD-10-CM

## 2018-08-24 PROCEDURE — 71046 X-RAY EXAM CHEST 2 VIEWS: CPT | Mod: 26

## 2018-08-24 PROCEDURE — 71046 X-RAY EXAM CHEST 2 VIEWS: CPT

## 2018-08-24 PROCEDURE — 99213 OFFICE O/P EST LOW 20 MIN: CPT

## 2018-11-27 PROBLEM — Z85.118 HISTORY OF ADENOCARCINOMA OF LUNG: Status: RESOLVED | Noted: 2018-05-22 | Resolved: 2018-11-27

## 2018-11-30 ENCOUNTER — APPOINTMENT (OUTPATIENT)
Dept: THORACIC SURGERY | Facility: CLINIC | Age: 73
End: 2018-11-30
Payer: MEDICARE

## 2018-11-30 VITALS
DIASTOLIC BLOOD PRESSURE: 64 MMHG | BODY MASS INDEX: 21.62 KG/M2 | SYSTOLIC BLOOD PRESSURE: 118 MMHG | OXYGEN SATURATION: 97 % | RESPIRATION RATE: 18 BRPM | HEIGHT: 69 IN | TEMPERATURE: 98.1 F | HEART RATE: 71 BPM | WEIGHT: 146 LBS

## 2018-11-30 DIAGNOSIS — Z85.118 PERSONAL HISTORY OF OTHER MALIGNANT NEOPLASM OF BRONCHUS AND LUNG: ICD-10-CM

## 2018-11-30 PROCEDURE — 99213 OFFICE O/P EST LOW 20 MIN: CPT

## 2019-06-07 ENCOUNTER — APPOINTMENT (OUTPATIENT)
Dept: THORACIC SURGERY | Facility: CLINIC | Age: 74
End: 2019-06-07
Payer: MEDICARE

## 2019-06-07 VITALS
TEMPERATURE: 96.7 F | OXYGEN SATURATION: 95 % | SYSTOLIC BLOOD PRESSURE: 134 MMHG | WEIGHT: 150 LBS | HEART RATE: 68 BPM | DIASTOLIC BLOOD PRESSURE: 69 MMHG | BODY MASS INDEX: 22.22 KG/M2 | RESPIRATION RATE: 19 BRPM | HEIGHT: 69 IN

## 2019-06-07 PROCEDURE — 99213 OFFICE O/P EST LOW 20 MIN: CPT

## 2019-06-07 NOTE — PHYSICAL EXAM
[Neck Appearance] : the appearance of the neck was normal [Neck Cervical Mass (___cm)] : no neck mass was observed [Jugular Venous Distention Increased] : there was no jugular-venous distention [] : no respiratory distress [Apical Impulse] : the apical impulse was normal [Examination Of The Chest] : the chest was normal in appearance [2+] : left 2+ [Abdomen Soft] : soft [Abdomen Tenderness] : non-tender [No Focal Deficits] : no focal deficits [Oriented To Time, Place, And Person] : oriented to person, place, and time

## 2019-06-10 NOTE — HISTORY OF PRESENT ILLNESS
[FreeTextEntry1] : 74 y/o male, former smoker for 30 years( 1PPD, quit 6 years ago), with pmhx of HLD, gastric ulcer, B/L inguinal hernia in 1995 in [place], right and left inguinal hernia repair in 2009, who is now 6 months s/p Left VATS, robotic assisted, extensive lysis of pulmonary adhesions, wedge resection of LLL anterior basilar segment lung nodule, wedge resection of MARIELA lingular subpleural nodule, LLL basilar segmentectomy, MLND on 5/11/18. \par \par Pathology revealed pT1 b (m), pN0, IA2. \par -LLL wedge resection: invasive adenocarcinoma, micropapillary pattern predominant ( 50%), with solid 30% and acinar 20% components. \par -MARIELA wedge resection: sclerosing pneumocytoma.\par - LLL basilar segmentectomy: invasive adenocarcinoma, acinar pattern predominant. \par TWO separate foci of invasive adenocarcinoma are identified, favor two synchronous primary tumors. \par [employer] NGS lung tumor sequencing report: \par PDL-1 was low positive( 5%). + EGFR p. Matt 858Arg, - ALK, - ROS1. EGFR T 790M mutation was not detected. \par \par CT chest on 11/17/18:\par -No evidence of residual/ recurrent tumoral disease. Small left pleural effusion in the posterior sulcus. \par -No evidence of thoracic adenopathy by criteria. \par \par CT Chest 6/1/19\par - s/p LLL lobectomy and lingula wedge resection\par - no evidence of residual/recurrent tumoral disease\par \par Patient doing well. Denies SOB at rest, cough, hemoptysis, chest discomfort, fever/chills. \par

## 2019-06-10 NOTE — CONSULT LETTER
[FreeTextEntry3] : Ghassan Weber MD\par Professor, Cardiovascular & Thoracic Surgery\par [facility]\par Director of the [facility] \par Director of Thoracic Surgery, [facility]\par [address]\par [facility] 4th Floor\par [place] [address]\par Phone: [phone]\par Fax: [phone]  [FreeTextEntry2] : DR TRAVIS SORIANO MD

## 2019-06-10 NOTE — ASSESSMENT
[FreeTextEntry1] : 72 y/o male, former smoker for 30 years( 1PPD, quit 6 years ago), with PMhx of HLD, gastric ulcer, B/L inguinal hernia in 1995 in [place], right and left inguinal hernia repair in 2009, who is now 6 months s/p Left VATS, robotic assisted, extensive lysis of pulmonary adhesions, wedge resection of LLL anterior basilar segment lung nodule, wedge resection of MARIELA lingular subpleural nodule, LLL basilar segmentectomy, MLND on 5/11/18. \par \par Pathology revealed pT1 b (m), pN0, IA2. \par -LLL wedge resection: invasive adenocarcinoma, micropapillary pattern predominant ( 50%), with solid 30% and acinar 20% components. \par -MARIELA wedge resection: sclerosing pneumocytoma.\par - LLL basilar segmentectomy: invasive adenocarcinoma, acinar pattern predominant. \par TWO separate foci of invasive adenocarcinoma are identified, favor two synchronous primary tumors. \par [employer] NGS lung tumor sequencing report: \par PDL-1 was low positive( 5%). + EGFR p. Matt 858Arg, - ALK, - ROS1. EGFR T 790M mutation was not detected. \par \par CT chest on 11/17/18:\par -No evidence of residual/ recurrent tumoral disease. Small left pleural effusion in the posterior sulcus. \par -No evidence of thoracic adenopathy by criteria. \par \par CT Chest 6/1/19\par - s/p LLL lobectomy and lingula wedge resection\par - no evidence of residual/recurrent tumoral disease\par \par Patient doing well. Denies SOB, cough, hemoptysis, chest discomfort, fever/chills. CT 6/1/19 reviewed with patient. CT stable, no clinical or radiographic evidence of recurrent disease. \par \par I have reviewed the patient's medical records and diagnostic images at the time of this office consultation and have made the following recommendation.\par Plan:\par 1. RTC 6m with CT Chest.

## 2019-12-13 ENCOUNTER — APPOINTMENT (OUTPATIENT)
Dept: THORACIC SURGERY | Facility: CLINIC | Age: 74
End: 2019-12-13
Payer: MEDICARE

## 2019-12-13 VITALS
OXYGEN SATURATION: 94 % | TEMPERATURE: 97.1 F | BODY MASS INDEX: 22.22 KG/M2 | HEIGHT: 69 IN | SYSTOLIC BLOOD PRESSURE: 136 MMHG | WEIGHT: 150 LBS | RESPIRATION RATE: 19 BRPM | DIASTOLIC BLOOD PRESSURE: 70 MMHG | HEART RATE: 82 BPM

## 2019-12-13 PROCEDURE — 99213 OFFICE O/P EST LOW 20 MIN: CPT

## 2019-12-13 NOTE — PHYSICAL EXAM
[Respiration, Rhythm And Depth] : normal respiratory rhythm and effort [Auscultation Breath Sounds / Voice Sounds] : lungs were clear to auscultation bilaterally [Exaggerated Use Of Accessory Muscles For Inspiration] : no accessory muscle use [Apical Impulse] : the apical impulse was normal [Heart Sounds] : normal S1 and S2 [Examination Of The Chest] : the chest was normal in appearance [Motor Tone] : muscle strength and tone were normal [Musculoskeletal - Swelling] : no joint swelling seen [Abnormal Walk] : normal gait [Nail Clubbing] : no clubbing  or cyanosis of the fingernails [Skin Color & Pigmentation] : normal skin color and pigmentation [] : no rash [No Focal Deficits] : no focal deficits [Oriented To Time, Place, And Person] : oriented to person, place, and time

## 2019-12-18 NOTE — CONSULT LETTER
[Dear  ___] : Dear  [unfilled], [Consult Letter:] : I had the pleasure of evaluating your patient, [unfilled]. [Please see my note below.] : Please see my note below. [Consult Closing:] : Thank you very much for allowing me to participate in the care of this patient.  If you have any questions, please do not hesitate to contact me. [Sincerely,] : Sincerely, [DrRowan  ___] : Dr. ADAMSON [FreeTextEntry3] : Ghassan Weber MD\par Professor, Cardiovascular & Thoracic Surgery\par [facility]\par Director of the [facility] \par Director of Thoracic Surgery, [facility]\par [facility]\par [address]\par [facility] 4th Floor\par [place] [address]\par Phone: [phone]\par Fax: [phone]

## 2019-12-18 NOTE — ASSESSMENT
[FreeTextEntry1] : 75 y/o male, former smoker for 30 years(1ppd, quit 6 years ago), with pmhx of HLD, gastric ulcer, B/L inguinal hernia in 1995 in [place], right and left inguinal hernia repair in 2009.\par \par He is s/p Left VATS, robotic assisted, extensive lysis of pulmonary adhesions, wedge resection of LLL anterior basilar segment lung nodule, wedge resection of MARIELA lingular subpleural nodule, LLL basilar segmentectomy, MLND on 5/11/18. \par \par Pathology revealed pT1b (m), pN0, IA2. \par -LLL wedge resection: invasive adenocarcinoma, micropapillary pattern predominant ( 50%), with solid 30% and acinar 20% components. \par -MARIELA wedge resection: sclerosing pneumocytoma.\par - LLL basilar segmentectomy: invasive adenocarcinoma, acinar pattern predominant. \par TWO separate foci of invasive adenocarcinoma are identified, favor two synchronous primary tumors. \par [employer] NGS lung tumor sequencing report: \par PDL-1 was low positive( 5%). + EGFR p. Matt 858Arg, - ALK, - ROS1. EGFR T 790M mutation was not detected. \par \par CT chest on 11/17/18:\par -No evidence of residual/ recurrent tumoral disease. Small left pleural effusion in the posterior sulcus. \par -No evidence of thoracic adenopathy by criteria. \par \par CT Chest 6/1/19\par - s/p LLL lobectomy and lingula wedge resection\par - no evidence of residual/recurrent tumoral disease\par \par CT Chest 12/7/19\par - No significant change compared to prior exam\par - Stable postsurgical changes related to left lower partial lobectomy and lingula wedge resection. \par - Small left pleural effusion. No new pulmonary nodule identified. \par - Evidence of prior granulomatous disease - Mild emphysema\par \par Patient presents today for a follow-up visit. Patient doing well. Denies SOB, cough, hemoptysis, chest discomfort, fever/chills. \par \par CT Chest 12/7/19 reviewed with patient. CT stable, no clinical or radiographic evidence of recurrent disease. \par \par I have reviewed the patient's medical records and diagnostic images at the time of this office consultation and have made the following recommendation.\par Plan:\par 1. RTC 6m with CT Chest

## 2019-12-18 NOTE — HISTORY OF PRESENT ILLNESS
[FreeTextEntry1] : 75 y/o male, former smoker for 30 years(1ppd, quit 6 years ago), with pmhx of HLD, gastric ulcer, B/L inguinal hernia in 1995 in [place], right and left inguinal hernia repair in 2009.\par \par He is s/p Left VATS, robotic assisted, extensive lysis of pulmonary adhesions, wedge resection of LLL anterior basilar segment lung nodule, wedge resection of MARIELA lingular subpleural nodule, LLL basilar segmentectomy, MLND on 5/11/18. \par \par Pathology revealed pT1b (m), pN0, IA2. \par -LLL wedge resection: invasive adenocarcinoma, micropapillary pattern predominant ( 50%), with solid 30% and acinar 20% components. \par -MARIELA wedge resection: sclerosing pneumocytoma.\par - LLL basilar segmentectomy: invasive adenocarcinoma, acinar pattern predominant. \par TWO separate foci of invasive adenocarcinoma are identified, favor two synchronous primary tumors. \par [employer] NGS lung tumor sequencing report: \par PDL-1 was low positive( 5%). + EGFR p. Matt 858Arg, - ALK, - ROS1. EGFR T 790M mutation was not detected. \par \par CT chest on 11/17/18:\par -No evidence of residual/ recurrent tumoral disease. Small left pleural effusion in the posterior sulcus. \par -No evidence of thoracic adenopathy by criteria. \par \par CT Chest 6/1/19\par - s/p LLL lobectomy and lingula wedge resection\par - no evidence of residual/recurrent tumoral disease\par \par CT Chest 12/7/19\par - No significant change compared to prior exam\par - Stable postsurgical changes related to left lower partial lobectomy and lingula wedge resection. \par - Small left pleural effusion. No new pulmonary nodule identified. \par - Evidence of prior granulomatous disease - Mild emphysema\par \par Patient presents today for a follow-up visit. Patient doing well. Denies SOB, cough, hemoptysis, chest discomfort, fever/chills.

## 2020-06-19 ENCOUNTER — APPOINTMENT (OUTPATIENT)
Dept: THORACIC SURGERY | Facility: CLINIC | Age: 75
End: 2020-06-19
Payer: MEDICARE

## 2020-06-19 PROCEDURE — 99441: CPT

## 2020-06-24 NOTE — END OF VISIT
[Time Spent: ___ minutes] : I have spent [unfilled] minutes of time on the encounter. [FreeTextEntry3] : I, MILLER HENRIQUEZ , am scribing for and in the presence of SERGO GOMEZ the following sections: history of present illness, past medical/family/surgical/family/social history, review of systems, vital signs, physical exam, and disposition.\par

## 2020-06-24 NOTE — CONSULT LETTER
[Dear  ___] : Dear  [unfilled], [Consult Letter:] : I had the pleasure of evaluating your patient, [unfilled]. [Please see my note below.] : Please see my note below. [Sincerely,] : Sincerely, [Consult Closing:] : Thank you very much for allowing me to participate in the care of this patient.  If you have any questions, please do not hesitate to contact me. [DrRowan  ___] : Dr. ADAMSON [FreeTextEntry3] : Ghassan Weber MD\par Professor, Cardiovascular & Thoracic Surgery\par [facility]\par Director of the [facility] \par Director of Thoracic Surgery, [facility]\par [facility]\par [address]\par [facility] 4th Floor\par [place] [address]\par Phone: [phone]\par Fax: [phone]

## 2020-06-24 NOTE — HISTORY OF PRESENT ILLNESS
[Home] : at home, [unfilled] , at the time of the visit. [Medical Office: ([facility])___] : at the medical office located in  [Verbal consent obtained from patient] : the patient, [unfilled] [FreeTextEntry1] : \par \par 73 y/o male, former smoker for 30 years(1ppd, quit 6 years ago), with pmhx of HLD, gastric ulcer, B/L inguinal hernia in 1995 in [place], right and left inguinal hernia repair in 2009.\par \par He is s/p Left VATS, robotic assisted, extensive lysis of pulmonary adhesions, wedge resection of LLL anterior basilar segment lung nodule, wedge resection of MARIELA lingular subpleural nodule, LLL basilar segmentectomy, MLND on 5/11/18. \par \par Pathology revealed pT1b (m), pN0, IA2. \par -LLL wedge resection: invasive adenocarcinoma, micropapillary pattern predominant ( 50%), with solid 30% and acinar 20% components. \par -MARIELA wedge resection: sclerosing pneumocytoma.\par - LLL basilar segmentectomy: invasive adenocarcinoma, acinar pattern predominant. \par TWO separate foci of invasive adenocarcinoma are identified, favor two synchronous primary tumors. \par [employer] NGS lung tumor sequencing report: \par PDL-1 was low positive( 5%). + EGFR p. Matt 858Arg, - ALK, - ROS1. EGFR T 790M mutation was not detected. \par \par CT chest on 11/17/18:\par -No evidence of residual/ recurrent tumoral disease. Small left pleural effusion in the posterior sulcus. \par -No evidence of thoracic adenopathy by criteria. \par \par CT Chest 6/1/19\par - s/p LLL lobectomy and lingula wedge resection\par - no evidence of residual/recurrent tumoral disease\par \par CT Chest 12/7/19\par - No significant change compared to prior exam\par - Stable postsurgical changes related to left lower partial lobectomy and lingula wedge resection. \par - Small left pleural effusion. No new pulmonary nodule identified. \par - Evidence of prior granulomatous disease - Mild emphysema\par \par CT Chest 6/17/20\par - s/p LLLx, No evidence of tumor recurrence of metastatic disease\par - Interval decrease in left pleural effusion with questionable trace residual \par - Stable minimal emphysema\par - Stable hiatal hernia\par - Stable thyroid nodules

## 2020-06-24 NOTE — ASSESSMENT
[FreeTextEntry1] : \par \par 73 y/o male, former smoker for 30 years(1ppd, quit 6 years ago), with pmhx of HLD, gastric ulcer, B/L inguinal hernia in 1995 in [place], right and left inguinal hernia repair in 2009.\par \par He is s/p Left VATS, robotic assisted, extensive lysis of pulmonary adhesions, wedge resection of LLL anterior basilar segment lung nodule, wedge resection of MARIELA lingular subpleural nodule, LLL basilar segmentectomy, MLND on 5/11/18. \par \par Pathology revealed pT1b (m), pN0, IA2. \par -LLL wedge resection: invasive adenocarcinoma, micropapillary pattern predominant ( 50%), with solid 30% and acinar 20% components. \par -MARIELA wedge resection: sclerosing pneumocytoma.\par - LLL basilar segmentectomy: invasive adenocarcinoma, acinar pattern predominant. \par TWO separate foci of invasive adenocarcinoma are identified, favor two synchronous primary tumors. \par [employer] NGS lung tumor sequencing report: \par PDL-1 was low positive( 5%). + EGFR p. Matt 858Arg, - ALK, - ROS1. EGFR T 790M mutation was not detected. \par \par CT chest on 11/17/18:\par -No evidence of residual/ recurrent tumoral disease. Small left pleural effusion in the posterior sulcus. \par -No evidence of thoracic adenopathy by criteria. \par \par CT Chest 6/1/19\par - s/p LLL lobectomy and lingula wedge resection\par - no evidence of residual/recurrent tumoral disease\par \par CT Chest 12/7/19\par - No significant change compared to prior exam\par - Stable postsurgical changes related to left lower partial lobectomy and lingula wedge resection. \par - Small left pleural effusion. No new pulmonary nodule identified. \par - Evidence of prior granulomatous disease - Mild emphysema\par \par CT Chest 6/17/20\par - s/p LLLx, No evidence of tumor recurrence of metastatic disease\par - Interval decrease in left pleural effusion with questionable trace residual \par - Stable minimal emphysema\par - Stable hiatal hernia\par - Stable thyroid nodules\par \par Patient presents today for a follow-up visit. Patient doing well. Denies SOB, cough, hemoptysis, chest discomfort, fever/chills. \par \par CT Chest 6/17/20 reviewed [address] patient. CT stable, no clinical or radiographic evidence of recurrent disease. \par \par I have reviewed the patient's medical records and diagnostic images at the time of this office consultation and have made the following recommendation.\par Plan:\par 1. CT Chest 6m \par 2. Advised patient to f/u with PCP regarding thyroid nodules.

## 2021-01-08 ENCOUNTER — APPOINTMENT (OUTPATIENT)
Dept: THORACIC SURGERY | Facility: CLINIC | Age: 76
End: 2021-01-08
Payer: MEDICARE

## 2021-01-08 PROCEDURE — 99211 OFF/OP EST MAY X REQ PHY/QHP: CPT

## 2021-01-08 PROCEDURE — 99072 ADDL SUPL MATRL&STAF TM PHE: CPT

## 2021-01-11 NOTE — END OF VISIT
[FreeTextEntry3] : I, MILLER HENRIQUEZ , am scribing for and in the presence of SERGO GOMEZ the following sections: history of present illness, past medical/family/surgical/family/social history, review of systems, vital signs, physical exam, and disposition.\par

## 2021-01-11 NOTE — ASSESSMENT
[FreeTextEntry1] : 74 y/o male, former smoker for 30 years(1ppd, quit 6 years ago), with pmhx of HLD, gastric ulcer, B/L inguinal hernia in 1995 in [place], right and left inguinal hernia repair in 2009.\par \par He is s/p Left VATS, robotic assisted, extensive lysis of pulmonary adhesions, wedge resection of LLL anterior basilar segment lung nodule, wedge resection of MARIELA lingular subpleural nodule, LLL basilar segmentectomy, MLND on 5/11/18. \par \par Pathology revealed pT1b (m), pN0, IA2. \par -LLL wedge resection: invasive adenocarcinoma, micropapillary pattern predominant ( 50%), with solid 30% and acinar 20% components. \par -MARIELA wedge resection: sclerosing pneumocytoma.\par - LLL basilar segmentectomy: invasive adenocarcinoma, acinar pattern predominant. \par TWO separate foci of invasive adenocarcinoma are identified, favor two synchronous primary tumors. \par [employer] NGS lung tumor sequencing report: \par PDL-1 was low positive( 5%). + EGFR p. Matt 858Arg, - ALK, - ROS1. EGFR T 790M mutation was not detected. \par \par CT chest on 11/17/18:\par -No evidence of residual/ recurrent tumoral disease. Small left pleural effusion in the posterior sulcus. \par -No evidence of thoracic adenopathy by criteria. \par \par CT Chest 6/1/19\par - s/p LLL lobectomy and lingula wedge resection\par - no evidence of residual/recurrent tumoral disease\par \par CT Chest 12/7/19\par - No significant change compared to prior exam\par - Stable postsurgical changes related to left lower partial lobectomy and lingula wedge resection. \par - Small left pleural effusion. No new pulmonary nodule identified. \par - Evidence of prior granulomatous disease - Mild emphysema\par \par CT Chest 6/17/20\par - s/p LLLx, No evidence of tumor recurrence of metastatic disease\par - Interval decrease in left pleural effusion with questionable trace residual \par - Stable minimal emphysema\par - Stable hiatal hernia\par - Stable thyroid nodules \par \par CT Chest 12/31/20\par - s/p LLLx and lingula wedge resection with stable postsurgical changes\par - No significant interval change since the previous examination \par \par \par Patient doing well. Denies SOB, cough, hemoptysis, chest discomfort, fever/chills. \par CT Chest 12/31/20 reivewed with patient. CT stable, no clinical or radiographic evidence of recurrent disease. \par \par I have reviewed the patient's medical records and diagnostic images at the time of this office consultation and have made the following recommendation.\par Plan:\par 1. CT Chest in 6 months\par

## 2021-01-11 NOTE — HISTORY OF PRESENT ILLNESS
[Home] : at home, [unfilled] , at the time of the visit. [Medical Office: ([facility])___] : at the medical office located in  [Verbal consent obtained from patient] : the patient, [unfilled] [FreeTextEntry1] : \par \par 74 y/o male, former smoker for 30 years(1ppd, quit 6 years ago), with pmhx of HLD, gastric ulcer, B/L inguinal hernia in 1995 in [place], right and left inguinal hernia repair in 2009.\par \par He is s/p Left VATS, robotic assisted, extensive lysis of pulmonary adhesions, wedge resection of LLL anterior basilar segment lung nodule, wedge resection of MARIELA lingular subpleural nodule, LLL basilar segmentectomy, MLND on 5/11/18. \par \par Pathology revealed pT1b (m), pN0, IA2. \par -LLL wedge resection: invasive adenocarcinoma, micropapillary pattern predominant ( 50%), with solid 30% and acinar 20% components. \par -MARIELA wedge resection: sclerosing pneumocytoma.\par - LLL basilar segmentectomy: invasive adenocarcinoma, acinar pattern predominant. \par TWO separate foci of invasive adenocarcinoma are identified, favor two synchronous primary tumors. \par [employer] NGS lung tumor sequencing report: \par PDL-1 was low positive( 5%). + EGFR p. Matt 858Arg, - ALK, - ROS1. EGFR T 790M mutation was not detected. \par \par CT chest on 11/17/18:\par -No evidence of residual/ recurrent tumoral disease. Small left pleural effusion in the posterior sulcus. \par -No evidence of thoracic adenopathy by criteria. \par \par CT Chest 6/1/19\par - s/p LLL lobectomy and lingula wedge resection\par - no evidence of residual/recurrent tumoral disease\par \par CT Chest 12/7/19\par - No significant change compared to prior exam\par - Stable postsurgical changes related to left lower partial lobectomy and lingula wedge resection. \par - Small left pleural effusion. No new pulmonary nodule identified. \par - Evidence of prior granulomatous disease - Mild emphysema\par \par CT Chest 6/17/20\par - s/p LLLx, No evidence of tumor recurrence of metastatic disease\par - Interval decrease in left pleural effusion with questionable trace residual \par - Stable minimal emphysema\par - Stable hiatal hernia\par - Stable thyroid nodules \par \par CT Chest 12/31/20\par - s/p LLLx and lingula wedge resection with stable postsurgical changes\par - No significant interval change since the previous examination \par

## 2021-01-11 NOTE — CONSULT LETTER
[FreeTextEntry3] : Ghassan Weber MD\par Professor, Cardiovascular & Thoracic Surgery\par [facility]\par Director of the [facility] \par Director of Thoracic Surgery, [facility]\par [facility]\par [address]\par [facility] 4th Floor\par [place] [id number]\par Phone: [phone]\par Fax: [phone]

## 2021-07-23 ENCOUNTER — APPOINTMENT (OUTPATIENT)
Dept: THORACIC SURGERY | Facility: CLINIC | Age: 76
End: 2021-07-23
Payer: MEDICARE

## 2021-07-23 PROCEDURE — 99442: CPT

## 2021-07-27 NOTE — REASON FOR VISIT
[Follow-Up: _____] : a [unfilled] follow-up visit [Home] : at home, [unfilled] , at the time of the visit. [Medical Office: ([facility])___] : at the medical office located in  [Verbal consent obtained from patient] : the patient, [unfilled] [FreeTextEntry1] : lung cancer surveillance

## 2021-07-27 NOTE — ASSESSMENT
[FreeTextEntry1] : 76 y/o male, former smoker for 30 years(1ppd, quit 6 years ago), with pmhx of HLD, gastric ulcer, B/L inguinal hernia in 1995 in [place], right and left inguinal hernia repair in 2009.\par \par He is s/p Left VATS, robotic assisted, extensive lysis of pulmonary adhesions, wedge resection of LLL anterior basilar segment lung nodule, wedge resection of MARIELA lingular subpleural nodule, LLL basilar segmentectomy, MLND on 5/11/18. \par \par Pathology revealed pT1b (m), pN0, IA2. \par -LLL wedge resection: invasive adenocarcinoma, micropapillary pattern predominant ( 50%), with solid 30% and acinar 20% components. \par -MARIELA wedge resection: sclerosing pneumocytoma.\par - LLL basilar segmentectomy: invasive adenocarcinoma, acinar pattern predominant. \par TWO separate foci of invasive adenocarcinoma are identified, favor two synchronous primary tumors. \par [employer] N[employer] lung tumor sequencing report: \par PDL-1 was low positive( 5%). + EGFR p. Matt 858Arg, - ALK, - ROS1. EGFR T 790M mutation was not detected. \par \par CT chest on 11/17/18:\par -No evidence of residual/ recurrent tumoral disease. Small left pleural effusion in the posterior sulcus. \par -No evidence of thoracic adenopathy by criteria. \par \par CT Chest 6/1/19\par - s/p LLL lobectomy and lingula wedge resection\par - no evidence of residual/recurrent tumoral disease\par \par CT Chest 12/7/19\par - No significant change compared to prior exam\par - Stable postsurgical changes related to left lower partial lobectomy and lingula wedge resection. \par - Small left pleural effusion. No new pulmonary nodule identified. \par - Evidence of prior granulomatous disease - Mild emphysema\par \par CT Chest 6/17/20\par - s/p LLLx, No evidence of tumor recurrence of metastatic disease\par - Interval decrease in left pleural effusion with questionable trace residual \par - Stable minimal emphysema\par - Stable hiatal hernia\par - Stable thyroid nodules \par \par CT Chest 12/31/20\par - s/p LLLx and lingula wedge resection with stable postsurgical changes\par - No significant interval change since the previous examination \par \par CT Chest 7/17/21\par 1. Stable postsurgical changes related to left lower lobectomy, including trace pleural effusion in the left lower hemithorax.\par 2. Evidence of prior granulomatous disease.\par 3. Mild emphysema.\par 4. Right renal calculus.\par \par Patient reports mild cough, but doing well. Denies SOB, hemoptysis, chest discomfort, fever/chills. \par CT Chest 7/17/21 reviewed with patient. CT stable, no clinical or radiographic evidence of recurrent disease. \par \par \par I have reviewed the patient's medical records and diagnostic images at the time of this office consultation and have made the following recommendation.\par Plan:\par 1. CT Chest in 6 months\par \par

## 2021-07-27 NOTE — END OF VISIT
[Time Spent: ___ minutes] : I have spent [unfilled] minutes of time on the encounter. [FreeTextEntry3] : I, MILLER HENRIQUEZ , am scribing for and in the presence of SERGO GOMEZ the following sections: history of present illness, past medical/family/surgical/family/social history, review of systems, vital signs, physical exam, and disposition.\par  \par I personally performed the services described in the documentation, reviewed the documentation recorded by the scribe in my presence and it accurately and completely records my words and actions.

## 2021-07-27 NOTE — CONSULT LETTER
[Dear  ___] : Dear  [unfilled], [Consult Letter:] : I had the pleasure of evaluating your patient, [unfilled]. [Please see my note below.] : Please see my note below. [Consult Closing:] : Thank you very much for allowing me to participate in the care of this patient.  If you have any questions, please do not hesitate to contact me. [Sincerely,] : Sincerely, [FreeTextEntry2] : Dr. Baltazar Nguyen\par Dr. Vinh Ruiz  [FreeTextEntry3] : Ghassan Weber MD\par Professor, Cardiovascular & Thoracic Surgery\par [facility]\par Director of the [facility] \par Director of Thoracic Surgery, [facility]\par \par [facility]\par [address]\par [facility] 4th Floor\par [place] [address]\par Phone: [phone]\par Fax: [phone]

## 2021-07-27 NOTE — HISTORY OF PRESENT ILLNESS
[FreeTextEntry1] : 77 y/o male, former smoker for 30 years ( 1ppd, quit 6 years ago), with pmhx of HLD, gastric ulcer, B/L inguinal hernia in 1995 in [place], right and left inguinal hernia repair in 2009.\par \par He is s/p Left VATS, robotic assisted, extensive lysis of pulmonary adhesions, wedge resection of LLL anterior basilar segment lung nodule, wedge resection of MARIELA lingular subpleural nodule, LLL basilar segmentectomy, MLND on 5/11/18. \par \par Pathology revealed pT1b (m), pN0, IA2. \par -LLL wedge resection: invasive adenocarcinoma, micropapillary pattern predominant ( 50%), with solid 30% and acinar 20% components. \par -MARIELA wedge resection: sclerosing pneumocytoma.\par - LLL basilar segmentectomy: invasive adenocarcinoma, acinar pattern predominant. \par TWO separate foci of invasive adenocarcinoma are identified, favor two synchronous primary tumors. \par [employer] N[employer] lung tumor sequencing report: \par PDL-1 was low positive( 5%). + EGFR p. Matt 858Arg, - ALK, - ROS1. EGFR T 790M mutation was not detected. \par \par CT chest on 11/17/18:\par -No evidence of residual/ recurrent tumoral disease. Small left pleural effusion in the posterior sulcus. \par -No evidence of thoracic adenopathy by criteria. \par \par CT Chest 6/1/19\par - s/p LLL lobectomy and lingula wedge resection\par - no evidence of residual/recurrent tumoral disease\par \par CT Chest 12/7/19\par - No significant change compared to prior exam\par - Stable postsurgical changes related to left lower partial lobectomy and lingula wedge resection. \par - Small left pleural effusion. No new pulmonary nodule identified. \par - Evidence of prior granulomatous disease - Mild emphysema\par \par CT Chest 6/17/20\par - s/p LLLx, No evidence of tumor recurrence of metastatic disease\par - Interval decrease in left pleural effusion with questionable trace residual \par - Stable minimal emphysema\par - Stable hiatal hernia\par - Stable thyroid nodules \par \par CT Chest 12/31/20\par - s/p LLLx and lingula wedge resection with stable postsurgical changes\par - No significant interval change since the previous examination \par \par CT Chest 7/17/21\par 1. Stable postsurgical changes related to left lower lobectomy, including trace pleural effusion in the left lower hemithorax.\par 2. Evidence of prior granulomatous disease.\par 3. Mild emphysema.\par 4. Right renal calculus.\par

## 2022-01-24 NOTE — HISTORY OF PRESENT ILLNESS
[FreeTextEntry1] : 75 y/o male, former smoker for 30 years ( 1ppd, quit 6 years ago), with pmhx of HLD, gastric ulcer, B/L inguinal hernia in 1995 in [place], right and left inguinal hernia repair in 2009. He is s/p Left VATS, robotic assisted, extensive lysis of pulmonary adhesions, wedge resection of LLL anterior basilar segment lung nodule, wedge resection of MARIELA lingular subpleural nodule, LLL basilar segmentectomy, MLND on 5/11/18. Pathology revealed pT1b (m), pN0, IA2. Invasive adenocarcinoma. TWO separate foci of invasive adenocarcinoma are identified, favor two synchronous primary tumors. He presents today for a follow up visit with a CT chest. \par \par Pathology revealed pT1b (m), pN0, IA2. \par -LLL wedge resection: invasive adenocarcinoma, micropapillary pattern predominant ( 50%), with solid 30% and acinar 20% components. \par -MARIELA wedge resection: sclerosing pneumocytoma.\par - LLL basilar segmentectomy: invasive adenocarcinoma, acinar pattern predominant. \par TWO separate foci of invasive adenocarcinoma are identified, favor two synchronous primary tumors. \par [employer] N[employer] lung tumor sequencing report: \par PDL-1 was low positive( 5%). + EGFR p. Matt 858Arg, - ALK, - ROS1. EGFR T 790M mutation was not detected. \par \par CT chest on 11/17/18:\par -No evidence of residual/ recurrent tumoral disease. Small left pleural effusion in the posterior sulcus. \par -No evidence of thoracic adenopathy by criteria. \par \par CT Chest 6/1/19\par - s/p LLL lobectomy and lingula wedge resection\par - no evidence of residual/recurrent tumoral disease\par \par CT Chest 12/7/19\par - No significant change compared to prior exam\par - Stable postsurgical changes related to left lower partial lobectomy and lingula wedge resection. \par - Small left pleural effusion. No new pulmonary nodule identified. \par - Evidence of prior granulomatous disease - Mild emphysema\par \par CT Chest 6/17/20\par - s/p LLLx, No evidence of tumor recurrence of metastatic disease\par - Interval decrease in left pleural effusion with questionable trace residual \par - Stable minimal emphysema\par - Stable hiatal hernia\par - Stable thyroid nodules \par \par CT Chest 12/31/20\par - s/p LLLx and lingula wedge resection with stable postsurgical changes\par - No significant interval change since the previous examination \par \par CT Chest 7/17/21\par 1. Stable postsurgical changes related to left lower lobectomy, including trace pleural effusion in the left lower hemithorax.\par 2. Evidence of prior granulomatous disease.\par 3. Mild emphysema.\par 4. Right renal calculus.\par \par CT chest done on 2/5/22:\par ***\par

## 2022-01-28 ENCOUNTER — APPOINTMENT (OUTPATIENT)
Dept: THORACIC SURGERY | Facility: CLINIC | Age: 77
End: 2022-01-28

## 2022-02-08 ENCOUNTER — NON-APPOINTMENT (OUTPATIENT)
Age: 77
End: 2022-02-08

## 2022-08-10 ENCOUNTER — APPOINTMENT (OUTPATIENT)
Dept: THORACIC SURGERY | Facility: CLINIC | Age: 77
End: 2022-08-10

## 2022-08-10 DIAGNOSIS — Z85.118 PERSONAL HISTORY OF OTHER MALIGNANT NEOPLASM OF BRONCHUS AND LUNG: ICD-10-CM

## 2022-08-10 DIAGNOSIS — R07.89 OTHER CHEST PAIN: ICD-10-CM

## 2022-08-10 PROCEDURE — 99442: CPT

## 2022-08-10 RX ORDER — OMEPRAZOLE 40 MG/1
40 CAPSULE, DELAYED RELEASE ORAL
Qty: 60 | Refills: 0 | Status: ACTIVE | COMMUNITY
Start: 2022-07-21

## 2022-08-10 RX ORDER — CYCLOSPORINE 0.5 MG/ML
0.05 EMULSION OPHTHALMIC
Qty: 180 | Refills: 0 | Status: ACTIVE | COMMUNITY
Start: 2022-07-21

## 2022-08-10 RX ORDER — BEPOTASTINE BESILATE 15 MG/ML
1.5 SOLUTION/ DROPS OPHTHALMIC
Qty: 10 | Refills: 0 | Status: ACTIVE | COMMUNITY
Start: 2022-07-21

## 2022-08-10 NOTE — HISTORY OF PRESENT ILLNESS
[FreeTextEntry1] : 78 y/o male, former smoker for 30 years ( 1ppd, quit 6 years ago), Cantonese speaker, with pmhx of HLD, gastric ulcer, B/L inguinal hernia in 1995 in [place], right and left inguinal hernia repair in 2009.\par \par He is s/p Left VATS, robotic assisted, extensive lysis of pulmonary adhesions, wedge resection of LLL anterior basilar segment lung nodule, wedge resection of MARIELA lingular subpleural nodule, LLL basilar segmentectomy, MLND on 5/11/18. \par \par Pathology revealed pT1b (m), pN0, Stage IA2, invasive adenocarcinoma. TWO separate foci of invasive adenocarcinoma (LLL wedge resection and LLL basilar segmentectomy) are identified, favor two synchronous primary tumors. \par -MARIELA wedge resection: sclerosing pneumocytoma.[employer] lung tumor sequencing report: PDL-1 was low positive( 5%). + EGFR p. Matt 858Arg, - ALK, - ROS1. EGFR T 790M mutation was not detected. No adjuvant treatment was needed. Has been on surveillance since. \par \par CT chest on 11/17/18:\par -No evidence of residual/ recurrent tumoral disease. Small left pleural effusion in the posterior sulcus. \par -No evidence of thoracic adenopathy by criteria. \par \par CT Chest 6/1/19\par - s/p LLL lobectomy and lingula wedge resection\par - no evidence of residual/recurrent tumoral disease\par \par CT Chest 12/7/19\par - No significant change compared to prior exam\par - Stable postsurgical changes related to left lower partial lobectomy and lingula wedge resection. \par - Small left pleural effusion. No new pulmonary nodule identified. \par - Evidence of prior granulomatous disease - Mild emphysema\par \par CT Chest 6/17/20\par - s/p LLLx, No evidence of tumor recurrence of metastatic disease\par - Interval decrease in left pleural effusion with questionable trace residual \par - Stable minimal emphysema\par - Stable hiatal hernia\par - Stable thyroid nodules \par \par CT Chest 12/31/20\par - s/p LLLx and lingula wedge resection with stable postsurgical changes\par - No significant interval change since the previous examination \par \par CT Chest 7/17/21\par 1. Stable postsurgical changes related to left lower lobectomy, including trace pleural effusion in the left lower hemithorax.\par 2. Evidence of prior granulomatous disease.\par 3. Mild emphysema.\par 4. Right renal calculus.\par \par Pt presents today to review the surveillance CT results. Patient reports feeling well in general. Occasional mild pain in left chest. Denies recent unintentional weight loss, fatigue, fever, chills, SOB, cough, or hemoptysis. \par \par CT chest on 08/06/2022\par - No significant interval change since the previous feb 2022 exam. \par - Stable postsurgical changes related to LLL partial lobectomy and lingula wedge resection\par - sequela of remote granulomatous disease

## 2022-08-10 NOTE — ASSESSMENT
[FreeTextEntry1] : 76 y/o male, former smoker for 30 years ( 1ppd, quit 6 years ago), with pmhx of HLD, gastric ulcer, B/L inguinal hernia in 1995 in [place], right and left inguinal hernia repair in 2009.\par \par He is s/p Left VATS,RA, extensive lysis of pulmonary adhesions, wedge resection of LLL anterior basilar segment lung nodule, wedge resection of MARIELA lingular subpleural nodule, LLL basilar segmentectomy, MLND on 5/11/18. \par \par Pathology revealed pT1b (m), pN0, Stage IA2, invasive adenocarcinoma. TWO separate foci of invasive adenocarcinoma (LLL wedge resection and LLL basilar segmentectomy) are identified, favor two synchronous primary tumors. Pt presented today after completion of a surveillance CT chest. \par \par CT chest on 08/06/2022 reviewed and discussed with the patient. Stable since Feb 2020, without evidence of recurrent disease. Pt reported occasional mild pain in left chest and back, without recent unintentional weight loss, fatigue, fever, chills, SOB, cough, or hemoptysis. I explained to the patient that the pain may relate to scarring of lung tissue or postop peripheral neuropathy. Can take OTC analgesics PRN. Call back if fever, chills, worse chest pain, cough, SOB, hemoptysis.  Will continue cancer surveillance with a CT chest in one year.  Pt verbally understood and agreed with the plan. \par \par \par Plan: follow up in one year with a CT chest noncontrast.

## 2022-08-10 NOTE — REASON FOR VISIT
[Home] : at home, [unfilled] , at the time of the visit. [Medical Office: ([facility])___] : at the medical office located in  [Verbal consent obtained from patient] : the patient, [unfilled] [FreeTextEntry1] : surveillance CT chest

## 2022-08-12 ENCOUNTER — APPOINTMENT (OUTPATIENT)
Dept: THORACIC SURGERY | Facility: CLINIC | Age: 77
End: 2022-08-12

## 2023-08-11 ENCOUNTER — APPOINTMENT (OUTPATIENT)
Dept: THORACIC SURGERY | Facility: CLINIC | Age: 78
End: 2023-08-11

## 2023-09-26 ENCOUNTER — APPOINTMENT (OUTPATIENT)
Dept: THORACIC SURGERY | Facility: CLINIC | Age: 78
End: 2023-09-26
Payer: MEDICARE

## 2023-09-26 DIAGNOSIS — Z08 ENCOUNTER FOR FOLLOW-UP EXAMINATION AFTER COMPLETED TREATMENT FOR MALIGNANT NEOPLASM: ICD-10-CM

## 2023-09-26 DIAGNOSIS — Z85.118 ENCOUNTER FOR FOLLOW-UP EXAMINATION AFTER COMPLETED TREATMENT FOR MALIGNANT NEOPLASM: ICD-10-CM

## 2023-09-26 PROCEDURE — 99442: CPT

## 2024-09-03 ENCOUNTER — APPOINTMENT (OUTPATIENT)
Dept: THORACIC SURGERY | Facility: CLINIC | Age: 79
End: 2024-09-03
Payer: MEDICARE

## 2024-09-03 ENCOUNTER — NON-APPOINTMENT (OUTPATIENT)
Age: 79
End: 2024-09-03

## 2024-09-03 DIAGNOSIS — Z85.118 ENCOUNTER FOR FOLLOW-UP EXAMINATION AFTER COMPLETED TREATMENT FOR MALIGNANT NEOPLASM: ICD-10-CM

## 2024-09-03 DIAGNOSIS — Z08 ENCOUNTER FOR FOLLOW-UP EXAMINATION AFTER COMPLETED TREATMENT FOR MALIGNANT NEOPLASM: ICD-10-CM

## 2024-09-03 PROCEDURE — 99442: CPT

## 2024-09-03 NOTE — HISTORY OF PRESENT ILLNESS
[FreeTextEntry1] : 79 yrs old male, former smoker for 30 years (1ppd, quit 6 years ago), Cantonese speaker, with pmhx of HLD, gastric ulcer, B/L inguinal hernia in 1995 in [place], right and left inguinal hernia repair in 2009.  He is s/p Left VATS, robotic assisted, extensive lysis of pulmonary adhesions, wedge resection of LLL anterior basilar segment lung nodule, wedge resection of MARIELA lingular subpleural nodule, LLL basilar segmentectomy, MLND on 5/11/18.  Pathology revealed pT1b (m), pN0, Stage IA2, invasive adenocarcinoma. TWO separate foci of invasive adenocarcinoma (LLL wedge resection and LLL basilar segmentectomy) are identified, favor two synchronous primary tumors. MARIELA wedge resection: sclerosing pneumocytoma. No adjuvant treatment was needed. Has been on surveillance since.  Patient presents today with a Surveillance CT chest.  Reports feeling well in general. No unintentional weight loss, headache, anorexia, fatigue, persist cough, chest pain, dyspnea, or hemoptysis.  CT chest on 08/24/2024 - Left lower lobectomy. - Stable mild emphysema. - Stable mild bronchiectasis in the right middle and right lower lobes. - Evidence of prior granulomatous disease.

## 2024-09-03 NOTE — ASSESSMENT
[FreeTextEntry1] : I reviewed the CT chest completed on 08/24/2024 with the patient today: Stable postoperative changes without evidence of recurrent disease.  Patient is doing well. Will continue Surveillance with non-contrast CT chest in one year. Pt agreed to the plan.   Plan: CT chest in one year.

## 2024-09-06 NOTE — ASSESSMENT
[FreeTextEntry1] : 79 yrs old male, now 6 yrs s/p wedge resection of LLL anterior basilar segment lung nodule, wedge resection of MARIELA lingular subpleural nodule, LLL basilar segmentectomy on 5/11/18. Pathology revealed pT1b (m), pN0, Stage IA2, invasive adenocarcinoma. TWO separate foci of invasive adenocarcinoma (LLL wedge resection and LLL basilar segmentectomy) are identified, favor two synchronous primary tumors. MARIELA wedge resection: sclerosing pneumocytoma. No adjuvant treatment was needed. Has been on surveillance since.  I reviewed the CT chest completed on 08/24/2024 with the patient today: Stable postoperative changes without evidence of recurrent disease.  Patient is doing well. Will continue Surveillance with non-contrast CT chest in one year. Pt agreed to the plan.   Plan: CT chest in one year.

## 2024-09-06 NOTE — REASON FOR VISIT
[Home] : at home, [unfilled] , at the time of the visit. [Medical Office: ([facility])___] : at the medical office located in  [Verbal consent obtained from patient] : the patient, [unfilled]

## 2025-09-05 ENCOUNTER — APPOINTMENT (OUTPATIENT)
Dept: THORACIC SURGERY | Facility: CLINIC | Age: 80
End: 2025-09-05
Payer: MEDICARE

## 2025-09-05 DIAGNOSIS — Z85.118 ENCOUNTER FOR FOLLOW-UP EXAMINATION AFTER COMPLETED TREATMENT FOR MALIGNANT NEOPLASM: ICD-10-CM

## 2025-09-05 DIAGNOSIS — Z08 ENCOUNTER FOR FOLLOW-UP EXAMINATION AFTER COMPLETED TREATMENT FOR MALIGNANT NEOPLASM: ICD-10-CM

## 2025-09-05 PROCEDURE — 99213 OFFICE O/P EST LOW 20 MIN: CPT | Mod: 93
